# Patient Record
Sex: FEMALE | Race: WHITE | NOT HISPANIC OR LATINO | Employment: FULL TIME | URBAN - METROPOLITAN AREA
[De-identification: names, ages, dates, MRNs, and addresses within clinical notes are randomized per-mention and may not be internally consistent; named-entity substitution may affect disease eponyms.]

---

## 2017-02-16 ENCOUNTER — ALLSCRIPTS OFFICE VISIT (OUTPATIENT)
Dept: OTHER | Facility: OTHER | Age: 62
End: 2017-02-16

## 2017-02-16 DIAGNOSIS — I10 ESSENTIAL (PRIMARY) HYPERTENSION: ICD-10-CM

## 2017-02-16 DIAGNOSIS — Z00.00 ENCOUNTER FOR GENERAL ADULT MEDICAL EXAMINATION WITHOUT ABNORMAL FINDINGS: ICD-10-CM

## 2017-02-16 DIAGNOSIS — E78.5 HYPERLIPIDEMIA: ICD-10-CM

## 2017-02-16 DIAGNOSIS — K21.9 GASTRO-ESOPHAGEAL REFLUX DISEASE WITHOUT ESOPHAGITIS: ICD-10-CM

## 2017-02-16 DIAGNOSIS — E55.9 VITAMIN D DEFICIENCY: ICD-10-CM

## 2017-02-16 DIAGNOSIS — M19.90 OSTEOARTHRITIS: ICD-10-CM

## 2017-03-27 ENCOUNTER — HOSPITAL ENCOUNTER (OUTPATIENT)
Dept: MAMMOGRAPHY | Facility: MEDICAL CENTER | Age: 62
Discharge: HOME | End: 2017-03-27
Payer: COMMERCIAL

## 2017-03-27 DIAGNOSIS — Z12.31 VISIT FOR SCREENING MAMMOGRAM: ICD-10-CM

## 2017-03-27 DIAGNOSIS — Z00.00 ENCOUNTER FOR GENERAL ADULT MEDICAL EXAMINATION WITHOUT ABNORMAL FINDINGS: ICD-10-CM

## 2017-03-27 PROCEDURE — G0202 SCR MAMMO BI INCL CAD: HCPCS

## 2017-03-27 PROCEDURE — 77063 BREAST TOMOSYNTHESIS BI: CPT

## 2017-03-29 ENCOUNTER — GENERIC CONVERSION - ENCOUNTER (OUTPATIENT)
Dept: OTHER | Facility: OTHER | Age: 62
End: 2017-03-29

## 2017-05-01 ENCOUNTER — ALLSCRIPTS OFFICE VISIT (OUTPATIENT)
Dept: OTHER | Facility: OTHER | Age: 62
End: 2017-05-01

## 2017-05-01 PROCEDURE — 87624 HPV HI-RISK TYP POOLED RSLT: CPT | Performed by: OBSTETRICS & GYNECOLOGY

## 2017-05-01 PROCEDURE — G0145 SCR C/V CYTO,THINLAYER,RESCR: HCPCS | Performed by: OBSTETRICS & GYNECOLOGY

## 2017-05-02 ENCOUNTER — LAB REQUISITION (OUTPATIENT)
Dept: LAB | Facility: HOSPITAL | Age: 62
End: 2017-05-02
Payer: COMMERCIAL

## 2017-05-02 DIAGNOSIS — Z12.4 ENCOUNTER FOR SCREENING FOR MALIGNANT NEOPLASM OF CERVIX: ICD-10-CM

## 2017-05-04 LAB — HPV RRNA GENITAL QL NAA+PROBE: NORMAL

## 2017-05-09 LAB
LAB AP GYN PRIMARY INTERPRETATION: NORMAL
Lab: NORMAL

## 2017-05-10 ENCOUNTER — GENERIC CONVERSION - ENCOUNTER (OUTPATIENT)
Dept: OTHER | Facility: OTHER | Age: 62
End: 2017-05-10

## 2017-07-20 ENCOUNTER — APPOINTMENT (OUTPATIENT)
Dept: LAB | Facility: CLINIC | Age: 62
End: 2017-07-20
Payer: COMMERCIAL

## 2017-07-20 ENCOUNTER — TRANSCRIBE ORDERS (OUTPATIENT)
Dept: LAB | Facility: CLINIC | Age: 62
End: 2017-07-20

## 2017-07-20 DIAGNOSIS — E78.5 HYPERLIPIDEMIA: ICD-10-CM

## 2017-07-20 DIAGNOSIS — M19.90 OSTEOARTHRITIS: ICD-10-CM

## 2017-07-20 DIAGNOSIS — I10 ESSENTIAL (PRIMARY) HYPERTENSION: ICD-10-CM

## 2017-07-20 DIAGNOSIS — K21.9 GASTRO-ESOPHAGEAL REFLUX DISEASE WITHOUT ESOPHAGITIS: ICD-10-CM

## 2017-07-20 DIAGNOSIS — E55.9 VITAMIN D DEFICIENCY: ICD-10-CM

## 2017-07-20 LAB
25(OH)D3 SERPL-MCNC: 20.9 NG/ML (ref 30–100)
ALBUMIN SERPL BCP-MCNC: 3.8 G/DL (ref 3.5–5)
ALP SERPL-CCNC: 86 U/L (ref 46–116)
ALT SERPL W P-5'-P-CCNC: 27 U/L (ref 12–78)
ANION GAP SERPL CALCULATED.3IONS-SCNC: 4 MMOL/L (ref 4–13)
AST SERPL W P-5'-P-CCNC: 13 U/L (ref 5–45)
BACTERIA UR QL AUTO: NORMAL /HPF
BASOPHILS # BLD AUTO: 0.04 THOUSANDS/ΜL (ref 0–0.1)
BASOPHILS NFR BLD AUTO: 1 % (ref 0–1)
BILIRUB SERPL-MCNC: 0.45 MG/DL (ref 0.2–1)
BILIRUB UR QL STRIP: NEGATIVE
BUN SERPL-MCNC: 17 MG/DL (ref 5–25)
CALCIUM SERPL-MCNC: 9 MG/DL (ref 8.3–10.1)
CHLORIDE SERPL-SCNC: 105 MMOL/L (ref 100–108)
CHOLEST SERPL-MCNC: 190 MG/DL (ref 50–200)
CLARITY UR: CLEAR
CO2 SERPL-SCNC: 27 MMOL/L (ref 21–32)
COLOR UR: YELLOW
CREAT SERPL-MCNC: 0.86 MG/DL (ref 0.6–1.3)
EOSINOPHIL # BLD AUTO: 0.16 THOUSAND/ΜL (ref 0–0.61)
EOSINOPHIL NFR BLD AUTO: 3 % (ref 0–6)
ERYTHROCYTE [DISTWIDTH] IN BLOOD BY AUTOMATED COUNT: 13.3 % (ref 11.6–15.1)
GFR SERPL CREATININE-BSD FRML MDRD: >60 ML/MIN/1.73SQ M
GLUCOSE P FAST SERPL-MCNC: 89 MG/DL (ref 65–99)
GLUCOSE UR STRIP-MCNC: NEGATIVE MG/DL
HCT VFR BLD AUTO: 38.7 % (ref 34.8–46.1)
HDLC SERPL-MCNC: 60 MG/DL (ref 40–60)
HGB BLD-MCNC: 12.4 G/DL (ref 11.5–15.4)
HGB UR QL STRIP.AUTO: ABNORMAL
HYALINE CASTS #/AREA URNS LPF: NORMAL /LPF
KETONES UR STRIP-MCNC: NEGATIVE MG/DL
LDLC SERPL CALC-MCNC: 109 MG/DL (ref 0–100)
LEUKOCYTE ESTERASE UR QL STRIP: ABNORMAL
LYMPHOCYTES # BLD AUTO: 2.54 THOUSANDS/ΜL (ref 0.6–4.47)
LYMPHOCYTES NFR BLD AUTO: 41 % (ref 14–44)
MCH RBC QN AUTO: 29.7 PG (ref 26.8–34.3)
MCHC RBC AUTO-ENTMCNC: 32 G/DL (ref 31.4–37.4)
MCV RBC AUTO: 93 FL (ref 82–98)
MONOCYTES # BLD AUTO: 0.52 THOUSAND/ΜL (ref 0.17–1.22)
MONOCYTES NFR BLD AUTO: 8 % (ref 4–12)
NEUTROPHILS # BLD AUTO: 2.89 THOUSANDS/ΜL (ref 1.85–7.62)
NEUTS SEG NFR BLD AUTO: 47 % (ref 43–75)
NITRITE UR QL STRIP: NEGATIVE
NON-SQ EPI CELLS URNS QL MICRO: NORMAL /HPF
NRBC BLD AUTO-RTO: 0 /100 WBCS
PH UR STRIP.AUTO: 6 [PH] (ref 4.5–8)
PLATELET # BLD AUTO: 326 THOUSANDS/UL (ref 149–390)
PMV BLD AUTO: 9.7 FL (ref 8.9–12.7)
POTASSIUM SERPL-SCNC: 4.2 MMOL/L (ref 3.5–5.3)
PROT SERPL-MCNC: 7.6 G/DL (ref 6.4–8.2)
PROT UR STRIP-MCNC: NEGATIVE MG/DL
RBC # BLD AUTO: 4.17 MILLION/UL (ref 3.81–5.12)
RBC #/AREA URNS AUTO: NORMAL /HPF
SODIUM SERPL-SCNC: 136 MMOL/L (ref 136–145)
SP GR UR STRIP.AUTO: 1.01 (ref 1–1.03)
TRIGL SERPL-MCNC: 106 MG/DL
TSH SERPL DL<=0.05 MIU/L-ACNC: 2.8 UIU/ML (ref 0.36–3.74)
UROBILINOGEN UR QL STRIP.AUTO: 0.2 E.U./DL
WBC # BLD AUTO: 6.17 THOUSAND/UL (ref 4.31–10.16)
WBC #/AREA URNS AUTO: NORMAL /HPF

## 2017-07-20 PROCEDURE — 82306 VITAMIN D 25 HYDROXY: CPT

## 2017-07-20 PROCEDURE — 80061 LIPID PANEL: CPT

## 2017-07-20 PROCEDURE — 84443 ASSAY THYROID STIM HORMONE: CPT

## 2017-07-20 PROCEDURE — 81001 URINALYSIS AUTO W/SCOPE: CPT

## 2017-07-20 PROCEDURE — 85025 COMPLETE CBC W/AUTO DIFF WBC: CPT

## 2017-07-20 PROCEDURE — 36415 COLL VENOUS BLD VENIPUNCTURE: CPT

## 2017-07-20 PROCEDURE — 80053 COMPREHEN METABOLIC PANEL: CPT

## 2017-07-21 ENCOUNTER — GENERIC CONVERSION - ENCOUNTER (OUTPATIENT)
Dept: OTHER | Facility: OTHER | Age: 62
End: 2017-07-21

## 2017-08-09 ENCOUNTER — LAB REQUISITION (OUTPATIENT)
Dept: LAB | Facility: HOSPITAL | Age: 62
End: 2017-08-09
Payer: COMMERCIAL

## 2017-08-09 ENCOUNTER — ALLSCRIPTS OFFICE VISIT (OUTPATIENT)
Dept: OTHER | Facility: OTHER | Age: 62
End: 2017-08-09

## 2017-08-09 DIAGNOSIS — R35.0 FREQUENCY OF MICTURITION: ICD-10-CM

## 2017-08-09 DIAGNOSIS — N39.0 URINARY TRACT INFECTION: ICD-10-CM

## 2017-08-09 LAB
BILIRUB UR QL STRIP: NEGATIVE
CLARITY UR: NORMAL
COLOR UR: CLEAR
GLUCOSE (HISTORICAL): NEGATIVE
HGB UR QL STRIP.AUTO: NORMAL
KETONES UR STRIP-MCNC: NEGATIVE MG/DL
LEUKOCYTE ESTERASE UR QL STRIP: NORMAL
NITRITE UR QL STRIP: NEGATIVE
PH UR STRIP.AUTO: 6.5 [PH]
PROT UR STRIP-MCNC: NEGATIVE MG/DL
SP GR UR STRIP.AUTO: 1.01
UROBILINOGEN UR QL STRIP.AUTO: 0.2

## 2017-08-09 PROCEDURE — 87186 SC STD MICRODIL/AGAR DIL: CPT | Performed by: FAMILY MEDICINE

## 2017-08-09 PROCEDURE — 87077 CULTURE AEROBIC IDENTIFY: CPT | Performed by: FAMILY MEDICINE

## 2017-08-09 PROCEDURE — 87086 URINE CULTURE/COLONY COUNT: CPT | Performed by: FAMILY MEDICINE

## 2017-08-11 LAB
BACTERIA UR CULT: NORMAL
BACTERIA UR CULT: NORMAL

## 2018-01-12 VITALS — BODY MASS INDEX: 28 KG/M2 | HEIGHT: 63 IN | WEIGHT: 158 LBS

## 2018-01-12 NOTE — RESULT NOTES
Verified Results  (1) CBC/PLT/DIFF [id number] 08:40AM [facility]    Order Number: [id number]     Test Name Result Flag Reference   WBC COUNT 6 17 Thousand/uL  4 31-10 16   RBC COUNT 4 17 Million/uL  3 81-5 12   HEMOGLOBIN 12 4 g/dL  11 5-15 4   HEMATOCRIT 38 7 %  34 8-46  1   MCV 93 fL  82-98   MCH 29 7 pg  26 8-34 3   MCHC 32 0 g/dL  31 4-37 4   RDW 13 3 %  11 6-15 1   MPV 9 7 fL  8 9-12 7   PLATELET COUNT 043 Thousands/uL  149-390   nRBC AUTOMATED 0 /100 WBCs     NEUTROPHILS RELATIVE PERCENT 47 %  43-75   LYMPHOCYTES RELATIVE PERCENT 41 %  14-44   MONOCYTES RELATIVE PERCENT 8 %  4-12   EOSINOPHILS RELATIVE PERCENT 3 %  0-6   BASOPHILS RELATIVE PERCENT 1 %  0-1   NEUTROPHILS ABSOLUTE COUNT 2 89 Thousands/? ??L  1 85-7 62   LYMPHOCYTES ABSOLUTE COUNT 2 54 Thousands/? ??L  0 60-4 47   MONOCYTES ABSOLUTE COUNT 0 52 Thousand/? ??L  0 17-1 22   EOSINOPHILS ABSOLUTE COUNT 0 16 Thousand/? ??L  0 00-0 61   BASOPHILS ABSOLUTE COUNT 0 04 Thousands/? ??L  0 00-0 10   - Patient Instructions: This bloodwork is non-fasting  Please drink two glasses of water morning of bloodwork       (1) COMPREHENSIVE METABOLIC PANEL [id number] 47:45QR [employer] Order Number: [id number]     Test Name Result Flag Reference   SODIUM 136 mmol/L  136-145   POTASSIUM 4 2 mmol/L  3 5-5 3   CHLORIDE 105 mmol/L  100-108   CARBON DIOXIDE 27 mmol/L  21-32   ANION GAP (CALC) 4 mmol/L  4-13   BLOOD UREA NITROGEN 17 mg/dL  5-25   CREATININE 0 86 mg/dL  0 60-1 30   Standardized to IDMS reference method   CALCIUM 9 0 mg/dL  8 3-10 1   BILI, TOTAL 0 45 mg/dL  0 20-1 00   ALK PHOSPHATAS 86 U/L     ALT (SGPT) 27 U/L  12-78   AST(SGOT) 13 U/L  5-45   ALBUMIN 3 8 g/dL  3 5-5 0   TOTAL PROTEIN 7 6 g/dL  6 4-8 2   eGFR Non-African American      >60 0 ml/min/1 73sq m   [employer] [employer] Disease Education Program recommendations are as follows:  GFR calculation is accurate only with a steady state creatinine  Chronic Kidney disease less than 60 ml/min/1 73 sq  meters  Kidney failure less than 15 ml/min/1 73 sq  meters  GLUCOSE FASTING 89 mg/dL  65-99     (1) LIPID PANEL, FASTING 20Jul2017 08:40AM [facility] Order Number: [id number]     Test Name Result Flag Reference   CHOLESTEROL 190 mg/dL     HDL,DIRECT 60 mg/dL  40-60   Specimen collection should occur prior to Metamizole administration due to the potential for falsely depressed results  LDL CHOLESTEROL CALCULATED 109 mg/dL H 0-100   - Patient Instructions: This is a fasting blood test  Water,black tea or black  coffee only after 9:00pm the night before test   Drink 2 glasses of water the morning of test       Triglyceride:         Normal              <150 mg/dl       Borderline High    150-199 mg/dl       High               200-499 mg/dl       Very High          >499 mg/dl  Cholesterol:         Desirable        <200 mg/dl      Borderline High  200-239 mg/dl      High             >239 mg/dl  HDL Cholesterol:        High    >59 mg/dL      Low     <41 mg/dL  LDL CALCULATED:    This screening LDL is a calculated result  It does not have the accuracy of the Direct Measured LDL in the monitoring of patients with hyperlipidemia and/or statin therapy  Direct Measure LDL (XDX135) must be ordered separately in these patients  TRIGLYCERIDES 106 mg/dL  <=150   Specimen collection should occur prior to N-Acetylcysteine or Metamizole administration due to the potential for falsely depressed results  (1) TSH WITH FT4 REFLEX 20Jul2017 08:40AM [facility] Order Number: [id number]     Test Name Result Flag Reference   TSH 2 800 uIU/mL  0 358-3 740   Patients undergoing fluorescein dye angiography may retain small amounts of fluorescein in the body for 48-72 hours post procedure  Samples containing fluorescein can produce falsely depressed TSH values   If the patient had this procedure,a specimen should be resubmitted post fluorescein clearance  The recommended reference ranges for TSH during pregnancy are as follows:  First trimester 0 1 to 2 5 uIU/mL  Second trimester  0 2 to 3 0 uIU/mL  Third trimester 0 3 to 3 0 uIU/m     (1) URINALYSIS (will reflex a microscopy if leukocytes, occult blood, protein or nitrites are not within normal limits) [id number] 08:40AM [facility] Order Number: [id number]     Test Name Result Flag Reference   COLOR Yellow     CLARITY Clear     SPECIFIC GRAVITY UA 1 011  1 003-1 030   PH UA 6 0  4 5-8 0   LEUKOCYTE ESTERASE UA Trace A Negative   NITRITE UA Negative  Negative   PROTEIN UA Negative mg/dl  Negative   GLUCOSE UA Negative mg/dl  Negative   KETONES UA Negative mg/dl  Negative   UROBILINOGEN UA 0 2 E U /dl  0 2, 1 0 E U /dl   BILIRUBIN UA Negative  Negative   BLOOD UA Trace A Negative   BACTERIA None Seen /hpf  None Seen, Occasional   EPITHELIAL CELLS Occasional /hpf  None Seen, Occasional   HYALINE CASTS None Seen /lpf  None Seen   RBC UA None Seen /hpf  None Seen   WBC UA None Seen /hpf  None Seen     (1) VITAMIN D 25-HYDROXY [id number] 08:40AM [facility] Order Number: [id number]     Test Name Result Flag Reference   VIT D 25-HYDROX 20 9 ng/mL L 30 0-100 0   This assay is a certified procedure of the CDC Vitamin D Standardization Certification Program (VDSCP)     Deficiency <20ng/ml   Insufficiency 20-30ng/ml   Sufficient  ng/ml     *Patients undergoing fluorescein dye angiography may retain small amounts of fluorescein in the body for 48-72 hours post procedure  Samples containing fluorescein can produce falsely elevated Vitamin D values  If the patient had this procedure, a specimen should be resubmitted post fluorescein clearance

## 2018-01-13 NOTE — RESULT NOTES
Verified Results  (1) THIN PREP PAP WITH IMAGING [id number] 12:00AM [facility]     Test Name Result Flag Reference   LAB AP CASE REPORT (Report)     Gynecologic Cytology Report            Case: [id number]                  Authorizing Provider: Jonny Jeans, MD    Collected:      05/01/2017           First Screen:     FANNY Morgan    Received:      05/03/2017 8491        Rescreen:       FANYN Hunt                           Specimen:  LIQUID-BASED PAP, SCREENING, Endocervical   HPV HIGH RISK RESULT (Report)     HPV, High Risk: HPV NEG, HPV16 NEG, HPV18 NEG      Other High Risk HPV Negative, HPV 16 Negative, HPV 18 Negative  HPV types: 16,18,31,33,35,39,45,51,52,56,58,59,66 and 68 DNA are undetectable or below the pre-set threshold  Roche???s FDA approved [place] 4800 is utilized with strict adherence to the ???s instruction  manual to test for the presence of High-Risk HPV DNA, as well as HPV 16 and HPV 18  This instrument  has been validated by our laboratory and/or by the   A negative result does not preclude the presence of HPV infection because results depend on adequate  specimen collection, absence of inhibitors and sufficient DNA to be detected  Additionally, HPV negative  results are not intended to prevent women from proceeding to colposcopy if clinically warranted  Positive HPV test results indicate the presence of any one or more of the high risk types, but since patients  are often co-infected with low-risk types it does not rule out the presence of low-risk types in patients  with mixed infections  LAB AP GYN PRIMARY INTERPRETATION      Negative for intraepithelial lesion or malignancy  Electronically signed by FANNY Hunt on 5/9/2017 at 4:06 PM   LAB AP GYN SPECIMEN ADEQUACY      Satisfactory for evaluation  Endocervical/transformation zone component present     LAB AP GYN ADDITIONAL INFORMATION (Report)     [employer]'s FDA approved ,  and ThinPrep Imaging System are   utilized with strict adherence to the 's instruction manual to   prepare gynecologic and non-gynecologic cytology specimens for the   production of ThinPrep slides as well as for gynecologic ThinPrep imaging  These processes have been validated by our laboratory and/or by the     The Pap test is not a diagnostic procedure and should not be used as the   sole means to detect cervical cancer  It is only a screening procedure to   aid in the detection of cervical cancer and its precursors  Both   false-negative and false-positive results have been experienced  Your   patient's test result should be interpreted in this context together with   the history and clinical findings

## 2018-01-14 VITALS
HEART RATE: 76 BPM | SYSTOLIC BLOOD PRESSURE: 120 MMHG | BODY MASS INDEX: 28.44 KG/M2 | DIASTOLIC BLOOD PRESSURE: 66 MMHG | WEIGHT: 158 LBS | TEMPERATURE: 98.1 F

## 2018-01-14 VITALS
HEART RATE: 80 BPM | DIASTOLIC BLOOD PRESSURE: 70 MMHG | WEIGHT: 155.5 LBS | HEIGHT: 63 IN | BODY MASS INDEX: 27.55 KG/M2 | SYSTOLIC BLOOD PRESSURE: 122 MMHG

## 2018-01-16 NOTE — RESULT NOTES
Message   Mammogram is normal      Verified Results  [facility] CAD [id number] 04:44PM Himanshu Beto Order Number: [id number]    - Patient Instructions: To schedule this appointment, please contact Central Scheduling at [phone]  Do not wear any perfume, powder, lotion or deodorant on breast or underarm area  Please bring your doctors order, referral (if needed) and insurance information with you on the day of the test  Failure to bring this information may result in this test being rescheduled  Arrive 15 minutes prior to your appointment time to register  On the day of your test, please bring any prior mammogram or breast studies with you that were not performed at a [facility]  Failure to bring prior exams may result in your test needing to be rescheduled  Test Name Result Flag Reference   MAMMO SCREENING BILATERAL W 3D & CAD (Report)     Patient History:   Patient is postmenopausal    Family history of breast cancer at age 72 in maternal aunt,    breast cancer at age 72 in maternal grandmother, breast cancer at   age 72 in maternal aunt  Excisional biopsy of the left breast, 2005  No Hormone Replacement Therapy   Patient is a former smoker  Patient's BMI is 25 7  Reason for exam: screening, asymptomatic  Mammo Screening Bilateral W DBT and CAD: March 27, 2017 - Check    In #: [de-identified]   2D/3D Procedure   3D views: Bilateral MLO view(s) were taken  2D views: Bilateral CC view(s) were taken  MLO view(s) were    taken of the right breast        Technologist: Nghia Dominguez, RT(R)(M)   Prior study comparison: November 7, 2014, bilateral digital    screening mammogram performed at [facility]  November 6, 2013, bilateral digital screening    mammogram performed at [place] [address]   November 5, 2012, bilateral digital screening mammogram    performed at [address] [place] [address] Center  October 26, 2011, bilateral digital screening mammogram performed at [place] [address]  October 25, 2010,    bilateral digital screening mammogram performed at [place] [address]  There are scattered fibroglandular densities  A combination of    mediolateral oblique 3-D tomographic slices as well as standard    two-dimensional orthogonal images were obtained  The parenchymal pattern appears stable  No dominant soft tissue    mass or suspicious calcifications are noted  A stable nodular    asymmetry is seen in the outer left breast  The skin and nipple    contours are within normal limits  No mammographic evidence of malignancy  No    significant changes when compared with prior studies  ACR BI-RADSï¾® Assessments: BiRad:1 - Negative     Recommendation:   Routine screening mammogram in 1 year  A reminder letter will be   scheduled  8-10% of cancers will be missed on mammography  Management of a    palpable abnormality must be based on clinical grounds  Patients    will be notified of their results via letter from our facility  Accredited by [employer] of Radiology and FDA  Transcription Location: LAVINIA Rodrigues 98: [id number]     Risk Value(s):   Tyrer-Cuzick 10 Year: 5 200%, Tyrer-Cuzick Lifetime: 13 300%,    Myriad Table: 1 5%, ADAN 5 Year: 1 6%, NCI Lifetime: 8 5%, MRS    : Based on personal and/or family history,    consideration of hereditary risk assessment may be warranted     Signed by:   Real Blount MD   3/28/17

## 2018-02-05 DIAGNOSIS — I10 ESSENTIAL HYPERTENSION: ICD-10-CM

## 2018-02-05 DIAGNOSIS — M19.90 ARTHRITIS: Primary | ICD-10-CM

## 2018-02-05 PROBLEM — F41.9 ANXIETY DISORDER: Status: ACTIVE | Noted: 2017-08-09

## 2018-02-05 RX ORDER — MELOXICAM 7.5 MG/1
TABLET ORAL
Qty: 90 TABLET | Refills: 1 | Status: SHIPPED | OUTPATIENT
Start: 2018-02-05 | End: 2018-08-31 | Stop reason: SDUPTHER

## 2018-02-05 RX ORDER — LISINOPRIL 10 MG/1
TABLET ORAL
Qty: 90 TABLET | Refills: 1 | Status: SHIPPED | OUTPATIENT
Start: 2018-02-05 | End: 2018-08-04 | Stop reason: SDUPTHER

## 2018-08-04 DIAGNOSIS — I10 ESSENTIAL HYPERTENSION: ICD-10-CM

## 2018-08-04 DIAGNOSIS — M19.90 ARTHRITIS: ICD-10-CM

## 2018-08-07 RX ORDER — LISINOPRIL 10 MG/1
TABLET ORAL
Qty: 30 TABLET | Refills: 0 | Status: SHIPPED | OUTPATIENT
Start: 2018-08-07 | End: 2018-08-31 | Stop reason: SDUPTHER

## 2018-08-07 RX ORDER — MELOXICAM 7.5 MG/1
TABLET ORAL
Qty: 90 TABLET | Refills: 1 | OUTPATIENT
Start: 2018-08-07

## 2018-08-27 ENCOUNTER — TELEPHONE (OUTPATIENT)
Dept: FAMILY MEDICINE CLINIC | Facility: CLINIC | Age: 63
End: 2018-08-27

## 2018-08-27 NOTE — TELEPHONE ENCOUNTER
[facility] insurance customer service ([phone]) on 8/27/18 at 3:15PM   Spoke with Alycia Mitchell, reference #[id number]  Per rep, [address] [place], [place] [address] is a participating provider with this Pt's plan

## 2018-08-31 ENCOUNTER — OFFICE VISIT (OUTPATIENT)
Dept: FAMILY MEDICINE CLINIC | Facility: CLINIC | Age: 63
End: 2018-08-31
Payer: COMMERCIAL

## 2018-08-31 VITALS
BODY MASS INDEX: 28.47 KG/M2 | HEART RATE: 80 BPM | SYSTOLIC BLOOD PRESSURE: 118 MMHG | WEIGHT: 158.2 LBS | DIASTOLIC BLOOD PRESSURE: 60 MMHG

## 2018-08-31 DIAGNOSIS — M25.531 RIGHT WRIST PAIN: ICD-10-CM

## 2018-08-31 DIAGNOSIS — G56.01 CARPAL TUNNEL SYNDROME OF RIGHT WRIST: Primary | ICD-10-CM

## 2018-08-31 DIAGNOSIS — F41.9 ANXIETY DISORDER, UNSPECIFIED TYPE: ICD-10-CM

## 2018-08-31 DIAGNOSIS — M19.90 ARTHRITIS: ICD-10-CM

## 2018-08-31 DIAGNOSIS — Z12.31 ENCOUNTER FOR SCREENING MAMMOGRAM FOR BREAST CANCER: ICD-10-CM

## 2018-08-31 DIAGNOSIS — M25.50 ARTHRALGIA OF MULTIPLE JOINTS: ICD-10-CM

## 2018-08-31 DIAGNOSIS — E78.5 HYPERLIPIDEMIA, UNSPECIFIED HYPERLIPIDEMIA TYPE: ICD-10-CM

## 2018-08-31 DIAGNOSIS — E55.9 VITAMIN D DEFICIENCY: ICD-10-CM

## 2018-08-31 DIAGNOSIS — I10 ESSENTIAL HYPERTENSION: ICD-10-CM

## 2018-08-31 PROCEDURE — 99214 OFFICE O/P EST MOD 30 MIN: CPT | Performed by: FAMILY MEDICINE

## 2018-08-31 RX ORDER — OMEPRAZOLE 20 MG/1
TABLET, DELAYED RELEASE ORAL
COMMUNITY
Start: 2017-02-16 | End: 2021-06-08 | Stop reason: SDUPTHER

## 2018-08-31 RX ORDER — MELOXICAM 7.5 MG/1
7.5 TABLET ORAL DAILY
Qty: 90 TABLET | Refills: 0 | Status: SHIPPED | OUTPATIENT
Start: 2018-08-31 | End: 2018-11-13 | Stop reason: SDUPTHER

## 2018-08-31 RX ORDER — LISINOPRIL 10 MG/1
10 TABLET ORAL DAILY
Qty: 90 TABLET | Refills: 3 | Status: SHIPPED | OUTPATIENT
Start: 2018-08-31 | End: 2019-08-12 | Stop reason: SDUPTHER

## 2018-08-31 RX ORDER — LORAZEPAM 0.5 MG/1
0.5 TABLET ORAL AS NEEDED
Qty: 30 TABLET | Refills: 0 | Status: SHIPPED | OUTPATIENT
Start: 2018-08-31 | End: 2019-11-19 | Stop reason: SDUPTHER

## 2018-08-31 RX ORDER — ACETAMINOPHEN,DIPHENHYDRAMINE HCL 500; 25 MG/1; MG/1
TABLET, FILM COATED ORAL
COMMUNITY
Start: 2017-02-16

## 2018-08-31 RX ORDER — MELATONIN
1000 DAILY
Qty: 30 TABLET | Refills: 0
Start: 2018-08-31

## 2018-08-31 RX ORDER — LORAZEPAM 0.5 MG/1
TABLET ORAL
COMMUNITY
End: 2018-08-31 | Stop reason: SDUPTHER

## 2018-08-31 NOTE — PATIENT INSTRUCTIONS
Carpal Tunnel Syndrome   AMBULATORY CARE:   Carpal tunnel syndrome (CTS)  is a condition that causes pressure to build in the carpal tunnel  The carpal tunnel is a small area between bones and tissues in your wrist  Swelling in this area puts pressure on the median nerve  The median nerve controls muscles and feeling in the hand  Common signs and symptoms:   · Dull, sharp, or shooting pain in your hand    · Numbness, tingling, or a burning feeling in your thumb, first finger, and middle finger    · Arm pain that may extend to your shoulder    · Weakness in your hand    · Swelling in your hand    · Not being able to control how your hand moves, or you drop objects  Seek care immediately if:   · You suddenly lose feeling in your hand or fingers and you cannot move them  · Your hand suddenly changes color  Contact your healthcare provider if:   · Your symptoms get worse  · Your hand and fingers are so weak that you cannot grab, squeeze, or lift items  · You have questions or concerns about your condition or care  Treatment  may not be needed  If your symptoms continue or are severe, you may need any of the following:  · NSAIDs  may be recommended to decrease swelling and pain  NSAIDs are available without a doctor's order  Ask your healthcare provider which medicine is right for you and how much to take  Take as directed  NSAIDs can cause stomach bleeding or kidney problems if not taken correctly  If you take blood thinning medicine, always ask your healthcare provider if NSAIDs are safe for you  · Steroid injections  may help decrease pain and swelling  Steroid medicine is injected into the carpal tunnel  · Transcutaneous electric nerve stimulation  uses mild electrical impulses to help decrease your wrist pain      · Surgery  called decompression may be used to take pressure off of the median nerve in your wrist   Manage your symptoms:   · Apply ice to your wrist   Ice helps decrease swelling and pain in your wrist  Ice may also help prevent tissue damage  Use an ice pack, or put crushed ice in a plastic bag  Cover the ice pack with a towel  Place it on your wrist for 15 to 20 minutes every hour, or as directed  · Rest your hands  Let your hands rest for a short time between repetitive motions, such as typing  If you feel pain, stop what you are doing and gently massage your wrist and hand  · Get physical and occupational therapy, if directed  Physical therapists will show you ways to exercise and strengthen your wrist  Occupational therapists will show you safe ways to use your wrist while you do your usual activities  · Use a wrist splint as directed  A splint will keep your wrist straight or in a slightly bent position  A wrist splint decreases pressure on the median nerve by letting your wrist rest  You may need to wear the splint for up to 8 weeks  You may need to wear it at night  Follow up with your healthcare provider as directed:  Write down your questions so you remember to ask them during your visits  © 2017 [facility] Information is for End User's use only and may not be sold, redistributed or otherwise used for commercial purposes  All illustrations and images included in CareNotes® are the copyrighted property of A D A M , Inc  or Marty Hernandez  The above information is an  only  It is not intended as medical advice for individual conditions or treatments  Talk to your doctor, nurse or pharmacist before following any medical regimen to see if it is safe and effective for you

## 2018-08-31 NOTE — PROGRESS NOTES
Assessment/Plan:    Carpal tunnel syndrome of right wrist  Right wrist pain, most likely carpal tunnel syndrome specially with her job function  Is start affecting her right hand strength, patient to continue with the NSAIDs until seen by the Orthopedics Hand surgery, advised to continue with splinting, icing  Hypertension  Very well controlled, continue with the lisinopril  GERD (gastroesophageal reflux disease)  Stable on over-the-counter Prilosec, had a very long discussion with the patient about the long-term use of NSAIDs and her acid reflux, patient need to be seen by Gastroenterology at some point for evaluation of chronic Anca Bernadette and the current use of NSAIDs  Hyperlipidemia  Follow up on lipid panel, continue with no medication  Vitamin D deficiency  Seem to be stable, patient need a DEXA scan at some point       Diagnoses and all orders for this visit:    Carpal tunnel syndrome of right wrist  -     Ambulatory referral to Hand Surgery; Future    Essential hypertension  -     lisinopril (ZESTRIL) 10 mg tablet; Take 1 tablet (10 mg total) by mouth daily  -     CBC and differential  -     Comprehensive metabolic panel    Arthritis  -     LORazepam (ATIVAN) 0 5 mg tablet; Take 1 tablet (0 5 mg total) by mouth as needed for anxiety  -     meloxicam (MOBIC) 7 5 mg tablet; Take 1 tablet (7 5 mg total) by mouth daily Take with food    Anxiety disorder, unspecified type    Vitamin D deficiency  -     cholecalciferol (VITAMIN D3) 1,000 units tablet; Take 1 tablet (1,000 Units total) by mouth daily    Arthralgia of multiple joints  -     Sedimentation rate, automated  -     Cyclic citrul peptide antibody, IgG  -     Lyme Antibody Profile with reflex to WB  -     RF Screen w/ Reflex to Titer  -     Ambulatory referral to Hand Surgery;  Future    Hyperlipidemia, unspecified hyperlipidemia type  -     Lipid panel    Right wrist pain    Encounter for screening mammogram for breast cancer  -     Mammo screening bilateral w 3d & cad; Future    Other orders  -     Discontinue: LORazepam (ATIVAN) 0 5 mg tablet; Take by mouth  -     omeprazole (PRILOSEC OTC) 20 MG tablet; Take by mouth  -     diphenhydrAMINE-acetaminophen (TYLENOL PM EXTRA STRENGTH)  MG TABS; Take by mouth          Subjective: Follow up for medication refills  Discuss dose of Meloxicam    C/o pain in right wrist and into thumb and pointer finger  kck     Patient ID: Lisset Rodriguez is a 63 y o  female  Patient is here complaining of right wrist pain being going on for the last many years but getting worse lately, pain on the lateral side of the right wrist, radiate to the thumb and the pointer finger, no numbness no tingling, but there is loss of strength, patient cannot lift or use her right hand as before  Have trouble closing and opening the door knob  No swelling no trauma  Patient overall joint pain and aches improved after taking vitamin-D 4000 units a day,  Patient anxiety is very well controlled, use 30 tablets of Xanax over the last year and she still have some left  Patient denied any acute complaint, her blood pressure is very well controlled  Patient use meloxicam on daily basis for her aches and pain  The following portions of the patient's history were reviewed and updated as appropriate: allergies, current medications, past family history, past medical history, past social history, past surgical history and problem list     Review of Systems   Constitutional: Negative for appetite change, chills and fever  HENT: Negative for congestion, sore throat and voice change  Eyes: Negative for pain and visual disturbance  Respiratory: Negative for cough  Cardiovascular: Negative for chest pain and palpitations  Gastrointestinal: Negative for abdominal pain, constipation, diarrhea and nausea  Endocrine: Negative for cold intolerance, heat intolerance and polyuria     Genitourinary: Negative for dysuria, enuresis, flank pain and frequency  Musculoskeletal: Negative for gait problem, joint swelling and neck pain  Skin: Negative for color change and wound  Neurological: Negative for dizziness, syncope, speech difficulty, numbness and headaches  Psychiatric/Behavioral: Negative for behavioral problems and confusion  The patient is not nervous/anxious  Objective:    Vitals:    08/31/18 0935   BP: 118/60   BP Location: Left arm   Patient Position: Sitting   Pulse: 80   Weight: 71 8 kg (158 lb 3 2 oz)      Physical Exam   Constitutional: She is oriented to person, place, and time  She appears well-developed and well-nourished  HENT:   Head: Normocephalic and atraumatic  Eyes: Conjunctivae and EOM are normal  Pupils are equal, round, and reactive to light  Neck: Normal range of motion  Neck supple  Cardiovascular: Normal rate, regular rhythm, normal heart sounds and intact distal pulses  Pulmonary/Chest: Effort normal and breath sounds normal    Abdominal: Soft  Bowel sounds are normal    Musculoskeletal: Normal range of motion  Neurological: She is alert and oriented to person, place, and time  She has normal reflexes  Skin: Skin is warm and dry  Psychiatric: She has a normal mood and affect  Her behavior is normal    Vitals reviewed

## 2018-08-31 NOTE — ASSESSMENT & PLAN NOTE
Right wrist pain, most likely carpal tunnel syndrome specially with her job function  Is start affecting her right hand strength, patient to continue with the NSAIDs until seen by the Orthopedics Hand surgery, advised to continue with splinting, icing

## 2018-08-31 NOTE — ASSESSMENT & PLAN NOTE
Stable on over-the-counter Prilosec, had a very long discussion with the patient about the long-term use of NSAIDs and her acid reflux, patient need to be seen by Gastroenterology at some point for evaluation of chronic Anca Bernadette and the current use of NSAIDs

## 2018-11-13 DIAGNOSIS — M19.90 ARTHRITIS: ICD-10-CM

## 2018-11-13 RX ORDER — MELOXICAM 7.5 MG/1
7.5 TABLET ORAL DAILY
Qty: 90 TABLET | Refills: 1 | Status: SHIPPED | OUTPATIENT
Start: 2018-11-13 | End: 2019-05-12 | Stop reason: SDUPTHER

## 2019-03-27 ENCOUNTER — OFFICE VISIT (OUTPATIENT)
Dept: FAMILY MEDICINE CLINIC | Facility: CLINIC | Age: 64
End: 2019-03-27
Payer: COMMERCIAL

## 2019-03-27 VITALS
HEART RATE: 72 BPM | BODY MASS INDEX: 28.17 KG/M2 | WEIGHT: 159 LBS | HEIGHT: 63 IN | DIASTOLIC BLOOD PRESSURE: 68 MMHG | SYSTOLIC BLOOD PRESSURE: 118 MMHG | RESPIRATION RATE: 16 BRPM

## 2019-03-27 DIAGNOSIS — Z12.31 ENCOUNTER FOR SCREENING MAMMOGRAM FOR BREAST CANCER: ICD-10-CM

## 2019-03-27 DIAGNOSIS — Z79.1 ENCOUNTER FOR MONITORING CHRONIC NSAID THERAPY: ICD-10-CM

## 2019-03-27 DIAGNOSIS — M25.531 CHRONIC PAIN OF RIGHT WRIST: ICD-10-CM

## 2019-03-27 DIAGNOSIS — G56.01 CARPAL TUNNEL SYNDROME OF RIGHT WRIST: ICD-10-CM

## 2019-03-27 DIAGNOSIS — G89.29 CHRONIC PAIN OF RIGHT WRIST: ICD-10-CM

## 2019-03-27 DIAGNOSIS — Z51.81 ENCOUNTER FOR MONITORING CHRONIC NSAID THERAPY: ICD-10-CM

## 2019-03-27 DIAGNOSIS — E66.3 OVERWEIGHT (BMI 25.0-29.9): ICD-10-CM

## 2019-03-27 DIAGNOSIS — E78.5 HYPERLIPIDEMIA, UNSPECIFIED HYPERLIPIDEMIA TYPE: ICD-10-CM

## 2019-03-27 DIAGNOSIS — I10 ESSENTIAL HYPERTENSION: Primary | ICD-10-CM

## 2019-03-27 DIAGNOSIS — Z00.00 ENCOUNTER FOR HEALTH MAINTENANCE EXAMINATION: ICD-10-CM

## 2019-03-27 DIAGNOSIS — K21.9 GASTROESOPHAGEAL REFLUX DISEASE WITHOUT ESOPHAGITIS: ICD-10-CM

## 2019-03-27 DIAGNOSIS — M25.50 ARTHRALGIA OF MULTIPLE JOINTS: ICD-10-CM

## 2019-03-27 PROBLEM — E58 DIETARY CALCIUM DEFICIENCY: Status: ACTIVE | Noted: 2017-05-01

## 2019-03-27 PROCEDURE — 99214 OFFICE O/P EST MOD 30 MIN: CPT | Performed by: FAMILY MEDICINE

## 2019-03-27 PROCEDURE — 99396 PREV VISIT EST AGE 40-64: CPT | Performed by: FAMILY MEDICINE

## 2019-03-27 NOTE — PATIENT INSTRUCTIONS

## 2019-03-27 NOTE — PROGRESS NOTES
BMI Counseling: Body mass index is 28 17 kg/m²  Discussed the patient's BMI with her   The BMI {VB BMI Counselin}

## 2019-03-27 NOTE — PROGRESS NOTES
Assessment and Plan:    Hypertension  Stable cont with meds     GERD (gastroesophageal reflux disease)  Most likley due to chronic NSAIDS use , need GI ans upper endoscopy  Cont with PPI     Carpal tunnel syndrome of right wrist  Pt need to do BW to r/o rheumatological disease , fu with Hand surgeon, ice ,elevation, wrist brace   Arthralgia of multiple joints  Need to r/o rheumatological disease , BW to be done  Hyperlipidemia  Lipids are improving, cont with lifestyl modification  Encounter for monitoring chronic NSAID therapy  Pt need to be tapered of the meloxicam, she is taking it for the last 5 years , started by her previous family physician, discussed with patient in length about the SE of long term use of NSAID she understand but that keep her functioning  To consider Celebrex for long term use, pt will check with her insurance, pt need to r/o RA or other rheumatological problem   Diagnoses and all orders for this visit:    Essential hypertension    Carpal tunnel syndrome of right wrist  -     Ambulatory referral to Hand Surgery; Future  -     XR wrist 3+ vw right; Future    Gastroesophageal reflux disease without esophagitis    Chronic pain of right wrist  -     Ambulatory referral to Hand Surgery; Future  -     XR wrist 3+ vw right; Future    Overweight (BMI 25 0-29  9)    Encounter for screening mammogram for breast cancer  -     Mammo screening bilateral w 3d & cad; Future    Hyperlipidemia, unspecified hyperlipidemia type    Arthralgia of multiple joints    Encounter for monitoring chronic NSAID therapy    Encounter for health maintenance examination            Subjective:      Patient ID: Erik Hull is a 66 y o  female  CC:    Chief Complaint   Patient presents with    Follow-up     pt here for a follow up and to review blood work results  R Ritesh    Wrist Pain     right wrist pain x 1 year sometimes gets swollen         HPI:    Patient here for follow-up on her chronic condition include hypertension hyperlipidemia, overweight, chronic right wrist pain and arthralgia of multiple joints, patient could not do her rheumatological workup due to her insurance and financial issue, patient had her basic blood work which were all acceptable include CBC CMP and lipid panel  Patient continued to complain of right wrist pain, she is losing her strength on her right wrist she is unable to hold any heavy object, denied any numbness or tingling the pain is on wrist joint itself now radiating up to the lower forearm area, denied any trauma  The following portions of the patient's history were reviewed and updated as appropriate: allergies, current medications, past family history, past medical history, past social history, past surgical history and problem list       Review of Systems   Constitutional: Negative for activity change, appetite change, chills, diaphoresis, fatigue and fever  HENT: Negative for congestion, postnasal drip, sinus pressure, sore throat and voice change  Eyes: Negative for pain, redness and visual disturbance  Respiratory: Negative for cough, chest tightness, shortness of breath and wheezing  Cardiovascular: Negative for chest pain, palpitations and leg swelling  Gastrointestinal: Negative for abdominal pain, constipation, diarrhea and nausea  Endocrine: Negative for cold intolerance, heat intolerance and polyuria  Genitourinary: Negative for dysuria, enuresis, flank pain and frequency  Musculoskeletal: Positive for arthralgias, joint swelling and myalgias  Negative for gait problem, neck pain and neck stiffness  Skin: Negative for color change and wound  Neurological: Negative for dizziness, syncope, speech difficulty, numbness and headaches  Psychiatric/Behavioral: Negative for behavioral problems and confusion  The patient is not nervous/anxious            Data to review:     Objective:    Vitals:    03/27/19 0826   BP: 118/68   BP Location: Left arm   Patient Position: Sitting   Cuff Size: Large   Pulse: 72   Resp: 16   Weight: 72 1 kg (159 lb)   Height: 5' 3" (1 6 m)        Physical Exam   Constitutional: She is oriented to person, place, and time  She appears well-developed and well-nourished  HENT:   Head: Normocephalic and atraumatic  Eyes: Pupils are equal, round, and reactive to light  Conjunctivae and EOM are normal    Neck: Normal range of motion  Neck supple  Cardiovascular: Normal rate, regular rhythm, normal heart sounds and intact distal pulses  Pulmonary/Chest: Effort normal and breath sounds normal    Abdominal: Soft  Bowel sounds are normal    Musculoskeletal: Normal range of motion  Arms:  Neurological: She is alert and oriented to person, place, and time  She has normal reflexes  Skin: Skin is warm and dry  Psychiatric: She has a normal mood and affect  Her behavior is normal    Nursing note and vitals reviewed  BMI Counseling: Body mass index is 28 17 kg/m²  Discussed the patient's BMI with her  The BMI is above average  BMI counseling and education was provided to the patient  Nutrition recommendations include reducing portion sizes

## 2019-03-28 PROBLEM — M25.531 CHRONIC PAIN OF RIGHT WRIST: Status: ACTIVE | Noted: 2019-03-28

## 2019-03-28 PROBLEM — Z79.1 ENCOUNTER FOR MONITORING CHRONIC NSAID THERAPY: Status: ACTIVE | Noted: 2019-03-28

## 2019-03-28 PROBLEM — G89.29 CHRONIC PAIN OF RIGHT WRIST: Status: ACTIVE | Noted: 2019-03-28

## 2019-03-28 PROBLEM — Z51.81 ENCOUNTER FOR MONITORING CHRONIC NSAID THERAPY: Status: ACTIVE | Noted: 2019-03-28

## 2019-03-28 NOTE — ASSESSMENT & PLAN NOTE
Pt need to be tapered of the meloxicam, she is taking it for the last 5 years , started by her previous family physician, discussed with patient in length about the SE of long term use of NSAID she understand but that keep her functioning  To consider Celebrex for long term use, pt will check with her insurance, pt need to r/o RA or other rheumatological problem

## 2019-03-28 NOTE — ASSESSMENT & PLAN NOTE
Pt need to do BW to r/o rheumatological disease , fu with Hand surgeon, ice ,elevation, wrist brace

## 2019-03-28 NOTE — PROGRESS NOTES
FAMILY PRACTICE OFFICE VISIT       NAME: Simona Mitchell  AGE: 66 y o  SEX: female       : 1955        MRN: [id number]    DATE: 3/28/2019  TIME: 10:14 AM    Assessment and Plan   Health maintenance  Appear in good health   To work on her weight   Colonoscopy, Mammo is UTD, new mammogram needed  Chief Complaint   Patient here for Wellness visit    History of Present Illness     Well Adult Physical: Patient here for a comprehensive physical exam The patient reports no problems  Do you take any herbs or supplements that were not prescribed by a doctor? no Are you taking calcium supplements? no Are you taking aspirin daily? yes   History:  LMP: No LMP recorded  Menopause at 52 years  Last pap date:   Abnormal pap? no      Review of Systems   Review of Systems    Active Problem List     Patient Active Problem List   Diagnosis    Anxiety disorder    Arthralgia of multiple joints    Arthritis    Fatigue    GERD (gastroesophageal reflux disease)    Hyperlipidemia    Hypertension    Migraine headache    Other ovarian failure    Simple goiter    Vitamin D deficiency    Carpal tunnel syndrome of right wrist    Dietary calcium deficiency       Past Medical History:  Past Medical History:   Diagnosis Date    Diverticulosis     of colon  Last assessed: 12    Hypertension        Past Surgical History:  Past Surgical History:   Procedure Laterality Date    BREAST BIOPSY Left 2005    Percutaneous Needle Core  Dr Winnie Tolentino  2007    MMR  Polyp benign  Dr Carrington Leaks      Surgically induced  By dilation and curettage  24 yo    OTHER SURGICAL HISTORY  2005    Anterior Colporrhaphy (for pelvic relaxation)  Dr Jing Gann Bilateral     23 yo    URETHRAL SLING  2005    Mid  TOT  Dr Mortimer Pluck (c ant colporr, Prolift, extraperitoneal colpopexy    UTERINE SUSPENSION  2005    Prolift, mesh   Dr Martín Victoria History:  Family History   Problem Relation Age of Onset    Hypertension Mother     Dementia Father     Hypertension Father     Stroke Father     Alcohol abuse Brother     Drug abuse Brother     Breast cancer Maternal Grandmother         MA x2    Dementia Maternal Grandmother         Several a, u's    Deep vein thrombosis Maternal Grandmother         Pregnancy related    Osteoporosis Paternal Grandmother     Emphysema Paternal Grandfather     Heart disease Paternal Grandfather         Ischemic    Migraines Daughter     Migraines Son     Breast cancer Maternal Aunt         MA x2    Dementia Paternal Aunt         Several a, u's    Dementia Paternal Uncle         Several a, u's    Colon cancer Cousin     Diabetes type I Other         Mellitus    Lung disease Family        Social History:  Social History     Socioeconomic History    Marital status: /Civil Union     Spouse name: Not on file    Number of children: Not on file    Years of education: Not on file    Highest education level: Not on file   Occupational History    Occupation: Customer Service     Comment: [address] [employer]  Financial resource strain: Not on file    Food insecurity:     Worry: Not on file     Inability: Not on file   [employer] needs:     Medical: Not on file     Non-medical: Not on file   Tobacco Use    Smoking status: Former Smoker    Smokeless tobacco: Never Used    Tobacco comment: Secondhand smoke exposure   smoked in house until 1/17    46 yrs 2017   Substance and Sexual Activity    Alcohol use: Yes     Comment: Social/3x month    Drug use: No    Sexual activity: Not on file   Lifestyle    Physical activity:     Days per week: Not on file     Minutes per session: Not on file    Stress: Not on file   Relationships    Social connections:     Talks on phone: Not on file     Gets together: Not on file     Attends Nondenominational service: Not on file     Active member of club or organization: Not on file     Attends meetings of clubs or organizations: Not on file     Relationship status: Not on file    Intimate partner violence:     Fear of current or ex partner: Not on file     Emotionally abused: Not on file     Physically abused: Not on file     Forced sexual activity: Not on file   Other Topics Concern    Not on file   Social History Narrative    Uses safety equipment: seatbelts       Objective     Vitals:    03/27/19 0826   BP: 118/68   Pulse: 72   Resp: 16     Wt Readings from Last 3 Encounters:   03/27/19 72 1 kg (159 lb)   08/31/18 71 8 kg (158 lb 3 2 oz)   08/09/17 71 7 kg (158 lb)           ALLERGIES:  No Known Allergies    Current Medications     Current Outpatient Medications   Medication Sig Dispense Refill    cholecalciferol (VITAMIN D3) 1,000 units tablet Take 1 tablet (1,000 Units total) by mouth daily 30 tablet 0    diphenhydrAMINE-acetaminophen (TYLENOL PM EXTRA STRENGTH)  MG TABS Take by mouth      lisinopril (ZESTRIL) 10 mg tablet Take 1 tablet (10 mg total) by mouth daily 90 tablet 3    LORazepam (ATIVAN) 0 5 mg tablet Take 1 tablet (0 5 mg total) by mouth as needed for anxiety 30 tablet 0    meloxicam (MOBIC) 7 5 mg tablet Take 1 tablet (7 5 mg total) by mouth daily Take with food 90 tablet 1    omeprazole (PRILOSEC OTC) 20 MG tablet Take by mouth       No current facility-administered medications for this visit  Health Maintenance     Health Maintenance   Topic Date Due    BMI: Followup Plan  09/04/1975    DTaP,Tdap,and Td Vaccines (1 - Tdap) 09/04/1978    INFLUENZA VACCINE  07/01/2018    MAMMOGRAM  03/27/2019    Hepatitis C Screening  04/26/2019 (Originally 1955)    Depression Screening PHQ  03/27/2020    BMI: Adult  03/27/2020    PAP SMEAR  05/01/2020    CRC Screening: Colonoscopy  12/14/2020    HEPATITIS B VACCINES  Aged Out       There is no immunization history on file for this patient      Karen Oneil MD

## 2019-05-12 DIAGNOSIS — M19.90 ARTHRITIS: ICD-10-CM

## 2019-05-13 RX ORDER — MELOXICAM 7.5 MG/1
TABLET ORAL
Qty: 90 TABLET | Refills: 1 | Status: SHIPPED | OUTPATIENT
Start: 2019-05-13 | End: 2019-11-19 | Stop reason: SDUPTHER

## 2019-08-12 DIAGNOSIS — I10 ESSENTIAL HYPERTENSION: ICD-10-CM

## 2019-08-12 RX ORDER — LISINOPRIL 10 MG/1
TABLET ORAL
Qty: 90 TABLET | Refills: 3 | Status: SHIPPED | OUTPATIENT
Start: 2019-08-12 | End: 2019-11-19 | Stop reason: SDUPTHER

## 2019-08-12 NOTE — TELEPHONE ENCOUNTER
Lisinopril was reordered patient needs appointment the next 1-2 months for follow-up of her hypertension

## 2019-11-09 DIAGNOSIS — M19.90 ARTHRITIS: ICD-10-CM

## 2019-11-11 RX ORDER — MELOXICAM 7.5 MG/1
TABLET ORAL
Qty: 90 TABLET | Refills: 4 | OUTPATIENT
Start: 2019-11-11

## 2019-11-18 PROBLEM — M23.8X9 DEFICIENCY OF ANTERIOR CRUCIATE LIGAMENT: Status: ACTIVE | Noted: 2019-11-18

## 2019-11-18 PROBLEM — M93.1 KIENBOCK'S DISEASE OF LUNATE BONE OF RIGHT WRIST IN ADULT: Status: ACTIVE | Noted: 2019-07-31

## 2019-11-18 PROBLEM — M25.469 KNEE JOINT EFFUSION: Status: ACTIVE | Noted: 2019-11-18

## 2019-11-18 PROBLEM — S82.143A FRACTURE OF TIBIAL PLATEAU: Status: ACTIVE | Noted: 2019-11-18

## 2019-11-18 PROBLEM — M25.569 KNEE PAIN: Status: ACTIVE | Noted: 2019-11-18

## 2019-11-19 ENCOUNTER — OFFICE VISIT (OUTPATIENT)
Dept: FAMILY MEDICINE CLINIC | Facility: CLINIC | Age: 64
End: 2019-11-19

## 2019-11-19 VITALS
DIASTOLIC BLOOD PRESSURE: 64 MMHG | HEART RATE: 80 BPM | HEIGHT: 63 IN | WEIGHT: 159 LBS | BODY MASS INDEX: 28.17 KG/M2 | SYSTOLIC BLOOD PRESSURE: 104 MMHG

## 2019-11-19 DIAGNOSIS — Z51.81 ENCOUNTER FOR MONITORING CHRONIC NSAID THERAPY: ICD-10-CM

## 2019-11-19 DIAGNOSIS — E55.9 VITAMIN D DEFICIENCY: ICD-10-CM

## 2019-11-19 DIAGNOSIS — F41.9 ANXIETY DISORDER, UNSPECIFIED TYPE: ICD-10-CM

## 2019-11-19 DIAGNOSIS — K21.9 GASTROESOPHAGEAL REFLUX DISEASE, ESOPHAGITIS PRESENCE NOT SPECIFIED: ICD-10-CM

## 2019-11-19 DIAGNOSIS — M19.90 ARTHRITIS: ICD-10-CM

## 2019-11-19 DIAGNOSIS — Z79.1 ENCOUNTER FOR MONITORING CHRONIC NSAID THERAPY: ICD-10-CM

## 2019-11-19 DIAGNOSIS — I10 ESSENTIAL HYPERTENSION: Primary | ICD-10-CM

## 2019-11-19 DIAGNOSIS — M25.50 ARTHRALGIA OF MULTIPLE JOINTS: ICD-10-CM

## 2019-11-19 PROBLEM — M25.569 KNEE PAIN: Status: RESOLVED | Noted: 2019-11-18 | Resolved: 2019-11-19

## 2019-11-19 PROBLEM — G56.01 CARPAL TUNNEL SYNDROME OF RIGHT WRIST: Status: RESOLVED | Noted: 2018-08-31 | Resolved: 2019-11-19

## 2019-11-19 PROCEDURE — 99214 OFFICE O/P EST MOD 30 MIN: CPT | Performed by: PHYSICIAN ASSISTANT

## 2019-11-19 RX ORDER — MELOXICAM 7.5 MG/1
7.5 TABLET ORAL DAILY
Qty: 90 TABLET | Refills: 1 | Status: SHIPPED | OUTPATIENT
Start: 2019-11-19 | End: 2020-05-18

## 2019-11-19 RX ORDER — LORAZEPAM 0.5 MG/1
0.5 TABLET ORAL AS NEEDED
Qty: 30 TABLET | Refills: 0 | Status: SHIPPED | OUTPATIENT
Start: 2019-11-19 | End: 2021-06-08 | Stop reason: SDUPTHER

## 2019-11-19 RX ORDER — LISINOPRIL 10 MG/1
10 TABLET ORAL DAILY
Qty: 90 TABLET | Refills: 3 | Status: SHIPPED | OUTPATIENT
Start: 2019-11-19 | End: 2021-01-10

## 2019-11-19 RX ORDER — ACETAMINOPHEN 500 MG
500 TABLET ORAL EVERY 6 HOURS PRN
COMMUNITY

## 2019-11-19 NOTE — PATIENT INSTRUCTIONS
Problem List Items Addressed This Visit        Digestive    GERD (gastroesophageal reflux disease)       Cardiovascular and Mediastinum    Hypertension - Primary       Blood pressure very stable continue current medication  Relevant Medications    lisinopril (ZESTRIL) 10 mg tablet    Other Relevant Orders    Comprehensive metabolic panel       Musculoskeletal and Integument    Arthritis    Relevant Medications    LORazepam (ATIVAN) 0 5 mg tablet    meloxicam (MOBIC) 7 5 mg tablet       Other    Anxiety disorder     Pt  given refill of ativan which she takes very sparingly  She has used less than 30 in 1 5 years  PMED check and appropriate  Relevant Medications    LORazepam (ATIVAN) 0 5 mg tablet    Arthralgia of multiple joints     Pt takes mobic daily 7 5  Continue to monitor  Vitamin D deficiency     Check D leel with next labs            Relevant Orders    Vitamin D 25 hydroxy    Encounter for monitoring chronic NSAID therapy

## 2019-11-19 NOTE — PROGRESS NOTES
Assessment and Plan:    Problem List Items Addressed This Visit        Digestive    GERD (gastroesophageal reflux disease)       Cardiovascular and Mediastinum    Hypertension - Primary       Blood pressure very stable continue current medication  Relevant Medications    lisinopril (ZESTRIL) 10 mg tablet    Other Relevant Orders    Comprehensive metabolic panel       Musculoskeletal and Integument    Arthritis    Relevant Medications    LORazepam (ATIVAN) 0 5 mg tablet    meloxicam (MOBIC) 7 5 mg tablet       Other    Anxiety disorder     Pt  given refill of ativan which she takes very sparingly  She has used less than 30 in 1 5 years  PMED check and appropriate  Relevant Medications    LORazepam (ATIVAN) 0 5 mg tablet    Arthralgia of multiple joints     Pt takes mobic daily 7 5  Continue to monitor  Vitamin D deficiency     Check D leel with next labs  Relevant Orders    Vitamin D 25 hydroxy    Encounter for monitoring chronic NSAID therapy                 Diagnoses and all orders for this visit:    Essential hypertension  -     lisinopril (ZESTRIL) 10 mg tablet; Take 1 tablet (10 mg total) by mouth daily  -     Comprehensive metabolic panel; Future    Gastroesophageal reflux disease, esophagitis presence not specified    Vitamin D deficiency  -     Vitamin D 25 hydroxy; Future    Anxiety disorder, unspecified type    Encounter for monitoring chronic NSAID therapy    Arthralgia of multiple joints    Arthritis  -     LORazepam (ATIVAN) 0 5 mg tablet; Take 1 tablet (0 5 mg total) by mouth as needed for anxiety  -     meloxicam (MOBIC) 7 5 mg tablet; Take 1 tablet (7 5 mg total) by mouth daily Take with food    Other orders  -     acetaminophen (TYLENOL) 500 mg tablet; Take 500 mg by mouth every 6 (six) hours as needed              Subjective:      Patient ID: Ifeoma Cifuentes is a 60 y o  female      CC:    Chief Complaint   Patient presents with    Follow-up     patient is here for a 6 month f/u re: chronic medical conditions  ak       HPI:      DM patient last seen March of this year  Here to be reestablished with a new PCP  Patient uses Ativan only infrequently and has not gone through more than 30 and 1-1/2 years  She needs refill of her blood pressure medication and her Mobic that she takes daily for arthritis in several parts of her body  No complaints today  She just had surgery on her right wrist       The following portions of the patient's history were reviewed and updated as appropriate: allergies, current medications, past family history, past medical history, past social history, past surgical history and problem list       Review of Systems   Constitutional: Negative  HENT: Negative  Eyes: Negative  Respiratory: Negative  Cardiovascular: Negative  Gastrointestinal: Negative  Endocrine: Negative  Genitourinary: Negative  Musculoskeletal: Negative  Skin: Negative  Allergic/Immunologic: Negative  Neurological: Negative  Hematological: Negative  Psychiatric/Behavioral: Negative  Data to review:       Objective:    Vitals:    11/19/19 1803   BP: 104/64   Pulse: 80   Weight: 72 1 kg (159 lb)   Height: 5' 3" (1 6 m)        Physical Exam   Constitutional: She is oriented to person, place, and time  She appears well-developed and well-nourished  No distress  HENT:   Head: Normocephalic and atraumatic  Eyes: Conjunctivae are normal  Right eye exhibits no discharge  Left eye exhibits no discharge  Neck: Neck supple  Carotid bruit is not present  Cardiovascular: Normal rate, regular rhythm and normal heart sounds  Exam reveals no gallop and no friction rub  No murmur heard  Pulmonary/Chest: Effort normal and breath sounds normal  No respiratory distress  She has no wheezes  She has no rales  Neurological: She is alert and oriented to person, place, and time  Skin: Skin is warm and dry  She is not diaphoretic     Psychiatric: She has a normal mood and affect  Judgment normal    Nursing note and vitals reviewed

## 2019-11-19 NOTE — ASSESSMENT & PLAN NOTE
Pt  given refill of ativan which she takes very sparingly  She has used less than 30 in 1 5 years  PMED check and appropriate

## 2020-03-31 ENCOUNTER — TELEMEDICINE (OUTPATIENT)
Dept: FAMILY MEDICINE CLINIC | Facility: CLINIC | Age: 65
End: 2020-03-31
Payer: COMMERCIAL

## 2020-03-31 VITALS
DIASTOLIC BLOOD PRESSURE: 77 MMHG | HEART RATE: 108 BPM | HEIGHT: 63 IN | SYSTOLIC BLOOD PRESSURE: 128 MMHG | BODY MASS INDEX: 28 KG/M2 | WEIGHT: 158 LBS

## 2020-03-31 DIAGNOSIS — Z00.00 HEALTHCARE MAINTENANCE: ICD-10-CM

## 2020-03-31 DIAGNOSIS — M19.90 ARTHRITIS: ICD-10-CM

## 2020-03-31 DIAGNOSIS — Z79.1 ENCOUNTER FOR MONITORING CHRONIC NSAID THERAPY: ICD-10-CM

## 2020-03-31 DIAGNOSIS — E55.9 VITAMIN D DEFICIENCY: ICD-10-CM

## 2020-03-31 DIAGNOSIS — Z51.81 ENCOUNTER FOR MONITORING CHRONIC NSAID THERAPY: ICD-10-CM

## 2020-03-31 DIAGNOSIS — K21.9 GASTROESOPHAGEAL REFLUX DISEASE, ESOPHAGITIS PRESENCE NOT SPECIFIED: ICD-10-CM

## 2020-03-31 DIAGNOSIS — Z12.39 BREAST CANCER SCREENING: ICD-10-CM

## 2020-03-31 DIAGNOSIS — F41.9 ANXIETY DISORDER, UNSPECIFIED TYPE: ICD-10-CM

## 2020-03-31 DIAGNOSIS — I10 ESSENTIAL HYPERTENSION: Primary | ICD-10-CM

## 2020-03-31 DIAGNOSIS — E78.5 HYPERLIPIDEMIA, UNSPECIFIED HYPERLIPIDEMIA TYPE: ICD-10-CM

## 2020-03-31 PROCEDURE — 3074F SYST BP LT 130 MM HG: CPT | Performed by: PHYSICIAN ASSISTANT

## 2020-03-31 PROCEDURE — 99214 OFFICE O/P EST MOD 30 MIN: CPT | Performed by: PHYSICIAN ASSISTANT

## 2020-03-31 PROCEDURE — 3008F BODY MASS INDEX DOCD: CPT | Performed by: PHYSICIAN ASSISTANT

## 2020-03-31 PROCEDURE — 3078F DIAST BP <80 MM HG: CPT | Performed by: PHYSICIAN ASSISTANT

## 2020-03-31 NOTE — ASSESSMENT & PLAN NOTE
CMP shows kidney function stable with a GFR of 63  No other anti-inflammatories such as ibuprofen Advil or Motrin should be used if patient takes a Mobic in the same day

## 2020-03-31 NOTE — PROGRESS NOTES
Virtual Brief Visit    Problem List Items Addressed This Visit        Digestive    GERD (gastroesophageal reflux disease)     Stables long as patient takes her omeprazole daily over-the-counter  Cardiovascular and Mediastinum    Hypertension - Primary     Stable continue current medication recheck 6 months in the office but hopefully  Musculoskeletal and Integument    Arthritis    Relevant Orders    Comprehensive metabolic panel       Other    Anxiety disorder     Stable with only rarely as needed use of lorazepam   Refills not needed at this time  Hyperlipidemia     Lipid panel done one year ago and was excellent  Check again in 6 months with CMP  Relevant Orders    Lipid panel    Vitamin D deficiency     Vit D level was great at 47  Continue daily supplementation and recheck in one year  Encounter for monitoring chronic NSAID therapy     CMP shows kidney function stable with a GFR of 63  No other anti-inflammatories such as ibuprofen Advil or Motrin should be used if patient takes a Mobic in the same day  Relevant Orders    Comprehensive metabolic panel    Healthcare maintenance     Mammogram due order given  It will be mailed to patient  Other Visit Diagnoses     Breast cancer screening        Relevant Orders    Mammo screening bilateral w 3d & cad          BMI Counseling: Body mass index is 27 99 kg/m²  The BMI is above normal  Nutrition recommendations include decreasing portion sizes, encouraging healthy choices of fruits and vegetables, decreasing fast food intake, consuming healthier snacks, limiting drinks that contain sugar, moderation in carbohydrate intake, increasing intake of lean protein, reducing intake of saturated and trans fat and reducing intake of cholesterol  Exercise recommendations include exercising 3-5 times per week  No pharmacotherapy was ordered  Reason for visit is follow up to tests done      Encounter provider Kayla Dalton PA-C    Provider located at [address] [place] [address] PRIMARY CARE  [address] [place] [address]      Recent Visits  No visits were found meeting these conditions  Showing recent visits within past 7 days and meeting all other requirements     Today's Visits  Date Type Provider Dept   03/31/20 [address] [facility]   Showing today's visits and meeting all other requirements     Future Appointments  Date Type Provider Dept   03/31/20 Telemedicine Patsy Phoenix PA-C [facility]   Showing future appointments within next 150 days and meeting all other requirements        After connecting through telephone, the patient was identified by name and date of birth  Harriett Marmolejo was informed that this is a telemedicine visit and that the visit is being conducted through telephone  My office door was closed  No one else was in the room  She acknowledged consent and understanding of privacy and security of the video platform  The patient has agreed to participate and understands they can discontinue the visit at any time  Patient is aware this is a billable service  Subjective  Harriett Marmolejo is a 60 y o  female pt  Past Medical History:   Diagnosis Date    Diverticulosis     of colon  Last assessed: 6/29/12    Hypertension        Past Surgical History:   Procedure Laterality Date    BREAST BIOPSY Left 05/2005    Percutaneous Needle Core  Dr Martin Casper  05/2007    MMR  Polyp benign  Dr Rusty Hamilton      Surgically induced  By dilation and curettage  26 yo    OTHER SURGICAL HISTORY  05/2005    Anterior Colporrhaphy (for pelvic relaxation)  Dr Raji Son Bilateral     25 yo    URETHRAL SLING  05/2005    Mid  TOT   Dr Radha Cho (c ant colporr, Prolift, extraperitoneal colpopexy    UTERINE SUSPENSION  05/2005    Prolift, mesh  Dr Zamzam Portillo       Current Outpatient Medications   Medication Sig Dispense Refill    acetaminophen (TYLENOL) 500 mg tablet Take 500 mg by mouth every 6 (six) hours as needed      cholecalciferol (VITAMIN D3) 1,000 units tablet Take 1 tablet (1,000 Units total) by mouth daily 30 tablet 0    diphenhydrAMINE-acetaminophen (TYLENOL PM EXTRA STRENGTH)  MG TABS Take by mouth      lisinopril (ZESTRIL) 10 mg tablet Take 1 tablet (10 mg total) by mouth daily 90 tablet 3    LORazepam (ATIVAN) 0 5 mg tablet Take 1 tablet (0 5 mg total) by mouth as needed for anxiety 30 tablet 0    meloxicam (MOBIC) 7 5 mg tablet Take 1 tablet (7 5 mg total) by mouth daily Take with food 90 tablet 1    omeprazole (PRILOSEC OTC) 20 MG tablet Take by mouth       No current facility-administered medications for this visit  No Known Allergies    Review of Systems   Constitutional: Negative  HENT: Negative  Eyes: Negative  Respiratory: Negative  Cardiovascular: Negative  Gastrointestinal: Negative  Endocrine: Negative  Genitourinary: Negative  Musculoskeletal: Negative  Skin: Negative  Allergic/Immunologic: Negative  Neurological: Negative  Hematological: Negative  Psychiatric/Behavioral: Negative  I spent 15 minutes with the patient during this visit  KRISTOFER Souza is here for chronic conditions f/u including the diagnosis of Essential hypertension  (primary encounter diagnosis)  Gastroesophageal reflux disease, esophagitis presence not specified  Arthritis  Anxiety disorder, unspecified type  Vitamin d deficiency  Hyperlipidemia, unspecified hyperlipidemia type  Encounter for monitoring chronic nsaid therapy   Pt  states they are taking all medications as directed without complaints or side effects  Pt  had labs done prior to today's visit which included blood work from [facility] that included a normal CMP with a glucose 97 GFR of 63 vitamin-D of 47  Patient states that she is doing very well without fever nausea vomiting or diarrhea  She is taking to the corn T needing and staying at home order from the government  She does have a home blood pressure cuff that is 22years old but is relatively accurate and today's reading she got while on the phone with me was 128/77 with a pulse of 1 weight  She was running around because she was late coming home from grocery shopping and missed a call from us and just got home and I called  Patient does not need any refills at this time  She continues to take her over-the-counter Prilosec daily due to Mobic use  She states that she sometimes uses an anti-inflammatory and I told her that she should not and stick with Tylenol only on top of her Mobic as they are all anti-inflammatories and hard on the kidneys  Data to review:          COVID 19 Instructions    Linh Oates was advised to limit contact with others to essential tasks such as getting food, medications, and medical care  Proper handwashing reviewed, and Hand sanitzer when washing is not available  If the patient develops symptoms of COVID 19, the patient should call the office as soon as possible  Patient was offered video visit today  Patient refused video visit  Examination today was by video/telephone visit  Observational measures that are included were noted on video or by patient/guardian description  Vital signs are include in this visit were obtained by the patient, using their own equipment at home

## 2020-05-17 DIAGNOSIS — M19.90 ARTHRITIS: ICD-10-CM

## 2020-05-18 RX ORDER — MELOXICAM 7.5 MG/1
TABLET ORAL
Qty: 90 TABLET | Refills: 3 | Status: SHIPPED | OUTPATIENT
Start: 2020-05-18 | End: 2021-05-13 | Stop reason: SDUPTHER

## 2021-01-10 DIAGNOSIS — I10 ESSENTIAL HYPERTENSION: ICD-10-CM

## 2021-01-10 RX ORDER — LISINOPRIL 10 MG/1
TABLET ORAL
Qty: 90 TABLET | Refills: 0 | Status: SHIPPED | OUTPATIENT
Start: 2021-01-10 | End: 2021-04-11

## 2021-03-30 DIAGNOSIS — Z23 ENCOUNTER FOR IMMUNIZATION: ICD-10-CM

## 2021-04-05 ENCOUNTER — IMMUNIZATIONS (OUTPATIENT)
Dept: FAMILY MEDICINE CLINIC | Facility: HOSPITAL | Age: 66
End: 2021-04-05

## 2021-04-05 DIAGNOSIS — Z23 ENCOUNTER FOR IMMUNIZATION: Primary | ICD-10-CM

## 2021-04-05 PROCEDURE — 91301 SARS-COV-2 / COVID-19 MRNA VACCINE (MODERNA) 100 MCG: CPT

## 2021-04-05 PROCEDURE — 0011A SARS-COV-2 / COVID-19 MRNA VACCINE (MODERNA) 100 MCG: CPT

## 2021-04-10 DIAGNOSIS — I10 ESSENTIAL HYPERTENSION: ICD-10-CM

## 2021-04-11 RX ORDER — LISINOPRIL 10 MG/1
TABLET ORAL
Qty: 90 TABLET | Refills: 3 | Status: SHIPPED | OUTPATIENT
Start: 2021-04-11 | End: 2022-02-14 | Stop reason: SDUPTHER

## 2021-05-06 ENCOUNTER — IMMUNIZATIONS (OUTPATIENT)
Dept: FAMILY MEDICINE CLINIC | Facility: HOSPITAL | Age: 66
End: 2021-05-06

## 2021-05-06 DIAGNOSIS — Z23 ENCOUNTER FOR IMMUNIZATION: Primary | ICD-10-CM

## 2021-05-06 PROCEDURE — 91301 SARS-COV-2 / COVID-19 MRNA VACCINE (MODERNA) 100 MCG: CPT

## 2021-05-06 PROCEDURE — 0012A SARS-COV-2 / COVID-19 MRNA VACCINE (MODERNA) 100 MCG: CPT

## 2021-05-13 DIAGNOSIS — Z11.59 NEED FOR HEPATITIS C SCREENING TEST: Primary | ICD-10-CM

## 2021-05-13 DIAGNOSIS — Z11.4 SCREENING FOR HIV (HUMAN IMMUNODEFICIENCY VIRUS): ICD-10-CM

## 2021-05-13 DIAGNOSIS — E78.5 HYPERLIPIDEMIA, UNSPECIFIED HYPERLIPIDEMIA TYPE: ICD-10-CM

## 2021-05-13 DIAGNOSIS — E55.9 VITAMIN D DEFICIENCY: ICD-10-CM

## 2021-05-13 DIAGNOSIS — M19.90 ARTHRITIS: ICD-10-CM

## 2021-05-13 DIAGNOSIS — I10 ESSENTIAL HYPERTENSION: ICD-10-CM

## 2021-05-13 RX ORDER — MELOXICAM 7.5 MG/1
TABLET ORAL
Qty: 90 TABLET | Refills: 3 | OUTPATIENT
Start: 2021-05-13

## 2021-05-13 RX ORDER — MELOXICAM 7.5 MG/1
7.5 TABLET ORAL DAILY
Qty: 30 TABLET | Refills: 0 | Status: SHIPPED | OUTPATIENT
Start: 2021-05-13 | End: 2021-06-22 | Stop reason: SDUPTHER

## 2021-05-13 NOTE — TELEPHONE ENCOUNTER
Pt may have 30 if needed but she has not been seen in the office for over a year and her last visit was virtual  I placed lab orders and pt needs to get them done and then OV to review before further fills

## 2021-06-01 ENCOUNTER — RA CDI HCC (OUTPATIENT)
Dept: OTHER | Facility: HOSPITAL | Age: 66
End: 2021-06-01

## 2021-06-01 NOTE — PROGRESS NOTES
[facility] 75  coding opportunities          Chart reviewed, no opportunity found: CHART REVIEWED, NO OPPORTUNITY FOUND              Patients insurance company: [employer] ([employer] and Commercial)

## 2021-06-02 ENCOUNTER — APPOINTMENT (OUTPATIENT)
Dept: LAB | Facility: CLINIC | Age: 66
End: 2021-06-02
Payer: COMMERCIAL

## 2021-06-02 DIAGNOSIS — Z11.59 NEED FOR HEPATITIS C SCREENING TEST: ICD-10-CM

## 2021-06-02 DIAGNOSIS — Z11.4 SCREENING FOR HIV (HUMAN IMMUNODEFICIENCY VIRUS): ICD-10-CM

## 2021-06-02 LAB
25(OH)D3 SERPL-MCNC: 65.4 NG/ML (ref 30–100)
ALBUMIN SERPL BCP-MCNC: 3.9 G/DL (ref 3.5–5)
ALP SERPL-CCNC: 95 U/L (ref 46–116)
ALT SERPL W P-5'-P-CCNC: 21 U/L (ref 12–78)
ANION GAP SERPL CALCULATED.3IONS-SCNC: 6 MMOL/L (ref 4–13)
AST SERPL W P-5'-P-CCNC: 9 U/L (ref 5–45)
BILIRUB SERPL-MCNC: 0.44 MG/DL (ref 0.2–1)
BUN SERPL-MCNC: 24 MG/DL (ref 5–25)
CALCIUM SERPL-MCNC: 9 MG/DL (ref 8.3–10.1)
CHLORIDE SERPL-SCNC: 110 MMOL/L (ref 100–108)
CHOLEST SERPL-MCNC: 157 MG/DL (ref 50–200)
CO2 SERPL-SCNC: 26 MMOL/L (ref 21–32)
CREAT SERPL-MCNC: 1.02 MG/DL (ref 0.6–1.3)
GFR SERPL CREATININE-BSD FRML MDRD: 59 ML/MIN/1.73SQ M
GLUCOSE P FAST SERPL-MCNC: 96 MG/DL (ref 65–99)
HCV AB SER QL: NORMAL
HDLC SERPL-MCNC: 53 MG/DL
LDLC SERPL CALC-MCNC: 84 MG/DL (ref 0–100)
POTASSIUM SERPL-SCNC: 4.2 MMOL/L (ref 3.5–5.3)
PROT SERPL-MCNC: 7.8 G/DL (ref 6.4–8.2)
SODIUM SERPL-SCNC: 142 MMOL/L (ref 136–145)
TRIGL SERPL-MCNC: 98 MG/DL

## 2021-06-02 PROCEDURE — 80053 COMPREHEN METABOLIC PANEL: CPT | Performed by: PHYSICIAN ASSISTANT

## 2021-06-02 PROCEDURE — 80061 LIPID PANEL: CPT | Performed by: PHYSICIAN ASSISTANT

## 2021-06-02 PROCEDURE — 87389 HIV-1 AG W/HIV-1&-2 AB AG IA: CPT

## 2021-06-02 PROCEDURE — 82306 VITAMIN D 25 HYDROXY: CPT | Performed by: PHYSICIAN ASSISTANT

## 2021-06-02 PROCEDURE — 86803 HEPATITIS C AB TEST: CPT

## 2021-06-02 PROCEDURE — 36415 COLL VENOUS BLD VENIPUNCTURE: CPT | Performed by: PHYSICIAN ASSISTANT

## 2021-06-03 LAB — HIV 1+2 AB+HIV1 P24 AG SERPL QL IA: NORMAL

## 2021-06-08 ENCOUNTER — OFFICE VISIT (OUTPATIENT)
Dept: FAMILY MEDICINE CLINIC | Facility: CLINIC | Age: 66
End: 2021-06-08
Payer: COMMERCIAL

## 2021-06-08 VITALS
HEIGHT: 63 IN | RESPIRATION RATE: 16 BRPM | DIASTOLIC BLOOD PRESSURE: 62 MMHG | SYSTOLIC BLOOD PRESSURE: 108 MMHG | WEIGHT: 158 LBS | BODY MASS INDEX: 28 KG/M2 | HEART RATE: 84 BPM

## 2021-06-08 DIAGNOSIS — Z00.00 HEALTHCARE MAINTENANCE: ICD-10-CM

## 2021-06-08 DIAGNOSIS — K63.5 POLYP OF COLON, UNSPECIFIED PART OF COLON, UNSPECIFIED TYPE: ICD-10-CM

## 2021-06-08 DIAGNOSIS — E78.5 HYPERLIPIDEMIA, UNSPECIFIED HYPERLIPIDEMIA TYPE: ICD-10-CM

## 2021-06-08 DIAGNOSIS — E55.9 VITAMIN D DEFICIENCY: ICD-10-CM

## 2021-06-08 DIAGNOSIS — Z12.31 ENCOUNTER FOR SCREENING MAMMOGRAM FOR BREAST CANCER: ICD-10-CM

## 2021-06-08 DIAGNOSIS — Z01.419 ENCOUNTER FOR GYNECOLOGICAL EXAMINATION: ICD-10-CM

## 2021-06-08 DIAGNOSIS — I10 ESSENTIAL HYPERTENSION: ICD-10-CM

## 2021-06-08 DIAGNOSIS — M19.90 ARTHRITIS: ICD-10-CM

## 2021-06-08 DIAGNOSIS — M25.50 ARTHRALGIA OF MULTIPLE JOINTS: ICD-10-CM

## 2021-06-08 DIAGNOSIS — F41.9 ANXIETY DISORDER, UNSPECIFIED TYPE: ICD-10-CM

## 2021-06-08 DIAGNOSIS — K21.9 GASTROESOPHAGEAL REFLUX DISEASE, UNSPECIFIED WHETHER ESOPHAGITIS PRESENT: Primary | ICD-10-CM

## 2021-06-08 PROCEDURE — 99214 OFFICE O/P EST MOD 30 MIN: CPT | Performed by: PHYSICIAN ASSISTANT

## 2021-06-08 PROCEDURE — 3008F BODY MASS INDEX DOCD: CPT | Performed by: PHYSICIAN ASSISTANT

## 2021-06-08 PROCEDURE — 1036F TOBACCO NON-USER: CPT | Performed by: PHYSICIAN ASSISTANT

## 2021-06-08 PROCEDURE — 3725F SCREEN DEPRESSION PERFORMED: CPT | Performed by: PHYSICIAN ASSISTANT

## 2021-06-08 RX ORDER — OMEPRAZOLE 20 MG/1
20 TABLET, DELAYED RELEASE ORAL DAILY
Qty: 90 TABLET | Refills: 3 | Status: SHIPPED | OUTPATIENT
Start: 2021-06-08 | End: 2021-06-08 | Stop reason: SDUPTHER

## 2021-06-08 RX ORDER — LORAZEPAM 0.5 MG/1
0.5 TABLET ORAL AS NEEDED
Qty: 30 TABLET | Refills: 0 | Status: SHIPPED | OUTPATIENT
Start: 2021-06-08

## 2021-06-08 RX ORDER — OMEPRAZOLE 20 MG/1
20 TABLET, DELAYED RELEASE ORAL DAILY
Qty: 90 TABLET | Refills: 3 | Status: SHIPPED | OUTPATIENT
Start: 2021-06-08 | End: 2021-06-14 | Stop reason: RX

## 2021-06-08 NOTE — ASSESSMENT & PLAN NOTE
Blood work up-to-date  HIV and hepatitis-C negative  Mammogram ordered  Shingles vaccine recommended  COVID vaccines completed

## 2021-06-08 NOTE — PATIENT INSTRUCTIONS
Problem List Items Addressed This Visit        Digestive    GERD (gastroesophageal reflux disease) - Primary       Stable on daily Prilosec  Patient encouraged to take every other day if able  She states that she would likely not need PPI if she did not need the Mobic  Relevant Medications    omeprazole (PriLOSEC OTC) 20 MG tablet    Colon polyps     First and only colonoscopy done 2010 with one polyp and pt never went for repeat which was due 2 years later  Info given for pt to return with Dr Elicia Cobian  Relevant Orders    Ambulatory referral to General Surgery       Cardiovascular and Mediastinum    Hypertension       Very stable continue current medication  Relevant Orders    Comprehensive metabolic panel       Other    Anxiety disorder       Stable on as needed Ativan  Arthralgia of multiple joints       Continues to be of help with 7 5 Mobic daily  Kidney function within normal limits  Pt plans to retire in the future and may not need mobic daily or at all  Hyperlipidemia       Patient is not on any medication for cholesterol and his LDL is 84 with a triglyceride of 98  Very stable check yearly  Relevant Orders    Lipid Panel with Direct LDL reflex    Vitamin D deficiency       Vitamin-D level very good at 67 continue supplementation recheck 1 year  Relevant Orders    Vitamin D 25 hydroxy    Healthcare maintenance       Blood work up-to-date  HIV and hepatitis-C negative  Mammogram ordered  Shingles vaccine recommended  COVID vaccines completed             Other Visit Diagnoses     Encounter for screening mammogram for breast cancer        Relevant Orders    Mammo screening bilateral w 3d & cad    Encounter for gynecological examination        Relevant Orders    Ambulatory referral to Gynecology

## 2021-06-08 NOTE — ASSESSMENT & PLAN NOTE
Stable on daily Prilosec  Patient encouraged to take every other day if able  She states that she would likely not need PPI if she did not need the Mobic

## 2021-06-08 NOTE — PROGRESS NOTES
Assessment and Plan:    Problem List Items Addressed This Visit        Digestive    GERD (gastroesophageal reflux disease) - Primary       Stable on daily Prilosec  Patient encouraged to take every other day if able  She states that she would likely not need PPI if she did not need the Mobic  Relevant Medications    omeprazole (PriLOSEC OTC) 20 MG tablet    Colon polyps     First and only colonoscopy done 2010 with one polyp and pt never went for repeat which was due 2 years later  Info given for pt to return with Dr Michelle Cisneros  Relevant Orders    Ambulatory referral to General Surgery       Cardiovascular and Mediastinum    Hypertension       Very stable continue current medication  Relevant Orders    Comprehensive metabolic panel       Musculoskeletal and Integument    Arthritis    Relevant Medications    LORazepam (ATIVAN) 0 5 mg tablet       Other    Anxiety disorder       Stable on as needed Ativan  Relevant Medications    LORazepam (ATIVAN) 0 5 mg tablet    Arthralgia of multiple joints       Continues to be of help with 7 5 Mobic daily  Kidney function within normal limits  Pt plans to retire in the future and may not need mobic daily or at all  Hyperlipidemia       Patient is not on any medication for cholesterol and his LDL is 84 with a triglyceride of 98  Very stable check yearly  Relevant Orders    Lipid Panel with Direct LDL reflex    Vitamin D deficiency       Vitamin-D level very good at 67 continue supplementation recheck 1 year  Relevant Orders    Vitamin D 25 hydroxy    Healthcare maintenance       Blood work up-to-date  HIV and hepatitis-C negative  Mammogram ordered  Shingles vaccine recommended  COVID vaccines completed             Other Visit Diagnoses     Encounter for screening mammogram for breast cancer        Relevant Orders    Mammo screening bilateral w 3d & cad    Encounter for gynecological examination        Relevant Orders    Ambulatory referral to Gynecology                 Diagnoses and all orders for this visit:    Gastroesophageal reflux disease, unspecified whether esophagitis present  -     Discontinue: omeprazole (PriLOSEC OTC) 20 MG tablet; Take 1 tablet (20 mg total) by mouth daily  -     omeprazole (PriLOSEC OTC) 20 MG tablet; Take 1 tablet (20 mg total) by mouth daily    Essential hypertension  -     Comprehensive metabolic panel; Future    Anxiety disorder, unspecified type    Vitamin D deficiency  -     Vitamin D 25 hydroxy; Future    Hyperlipidemia, unspecified hyperlipidemia type  -     Lipid Panel with Direct LDL reflex; Future    Encounter for screening mammogram for breast cancer  -     Mammo screening bilateral w 3d & cad; Future    Healthcare maintenance    Arthralgia of multiple joints    Encounter for gynecological examination  -     Ambulatory referral to Gynecology; Future    Polyp of colon, unspecified part of colon, unspecified type  -     Ambulatory referral to General Surgery; Future    Arthritis  -     LORazepam (ATIVAN) 0 5 mg tablet; Take 1 tablet (0 5 mg total) by mouth as needed for anxiety              Subjective:      Patient ID: Evelin Brewer is a 63 y o  female  CC:    Chief Complaint   Patient presents with    Follow-up     Patient present today for her routine follow up  HPI:      Evelin Brewer is here for chronic conditions f/u including the diagnosis of Gastroesophageal reflux disease, unspecified whether esophagitis present  (primary encounter diagnosis)  Essential hypertension  Anxiety disorder, unspecified type  Vitamin d deficiency  Hyperlipidemia, unspecified hyperlipidemia type  Encounter for screening mammogram for breast cancer   Pt  states they are taking all medications as directed without complaints or side effects   Pt  had labs done prior to today's visit which included Recent Results (from the past 672 hour(s))  -Comprehensive metabolic panel  Collection Time: 06/02/21  7:55 AM       Result                      Value             Ref Range           Sodium                      142               136 - 145 mm*       Potassium                   4 2               3 5 - 5 3 mm*       Chloride                    110 (H)           100 - 108 mm*       CO2                         26                21 - 32 mmol*       ANION GAP                   6                 4 - 13 mmol/L       BUN                         24                5 - 25 mg/dL        Creatinine                  1 02              0 60 - 1 30 *       Glucose, Fasting            96                65 - 99 mg/dL       Calcium                     9 0               8 3 - 10 1 m*       AST                         9                 5 - 45 U/L          ALT                         21                12 - 78 U/L         Alkaline Phosphatase        95                46 - 116 U/L        Total Protein               7 8               6 4 - 8 2 g/*       Albumin                     3 9               3 5 - 5 0 g/*       Total Bilirubin             0 44              0 20 - 1 00 *       eGFR                        59                ml/min/1 73s*  -Vitamin D 25 hydroxy  Collection Time: 06/02/21  7:55 AM       Result                      Value             Ref Range           Vit D, 25-Hydroxy           65 4              30 0 - 100 0*  -Lipid Panel with Direct LDL reflex  Collection Time: 06/02/21  7:55 AM       Result                      Value             Ref Range           Cholesterol                 157               50 - 200 mg/*       Triglycerides               98                <=150 mg/dL         HDL, Direct                 53                >=40 mg/dL          LDL Calculated              84                0 - 100 mg/dL  -Hepatitis C Antibody ([facility])  Collection Time: 06/02/21  7:55 AM       Result                      Value             Ref Range           Hepatitis C Ab              Non-reactive      Non-reactive   -HIV 1/2 Antigen/Antibody (4th Generation) w Reflex [facility]  Collection Time: 06/02/21  7:55 AM       Result                      Value             Ref Range           HIV-1/HIV-2 Ab              Non-Reactive      Non-Reactive         The following portions of the patient's history were reviewed and updated as appropriate: allergies, current medications, past family history, past medical history, past social history, past surgical history and problem list       Review of Systems   Constitutional: Negative  HENT: Negative  Eyes: Negative  Respiratory: Negative  Cardiovascular: Negative  Gastrointestinal: Negative  Endocrine: Negative  Genitourinary: Negative  Musculoskeletal: Negative  Skin: Negative  Allergic/Immunologic: Negative  Neurological: Negative  Hematological: Negative  Psychiatric/Behavioral: Negative  Data to review:       Objective:    Vitals:    06/08/21 1828   BP: 108/62   BP Location: Left arm   Patient Position: Sitting   Cuff Size: Large   Pulse: 84   Resp: 16   Weight: 71 7 kg (158 lb)   Height: 5' 3" (1 6 m)        Physical Exam  Vitals signs and nursing note reviewed  Constitutional:       Appearance: Normal appearance  She is well-developed  HENT:      Head: Normocephalic and atraumatic  Eyes:      General: Lids are normal       Conjunctiva/sclera: Conjunctivae normal       Pupils: Pupils are equal, round, and reactive to light  Cardiovascular:      Rate and Rhythm: Normal rate and regular rhythm  Heart sounds: No murmur  Pulmonary:      Effort: Pulmonary effort is normal       Breath sounds: Normal breath sounds  Skin:     General: Skin is warm and dry  Neurological:      General: No focal deficit present  Mental Status: She is alert  Coordination: Coordination is intact  Psychiatric:         Mood and Affect: Mood normal          Behavior: Behavior normal  Behavior is cooperative  Thought Content:  Thought content normal          Judgment: Judgment normal            BMI Counseling: Body mass index is 27 99 kg/m²  The BMI is above normal  Nutrition recommendations include decreasing portion sizes, encouraging healthy choices of fruits and vegetables, decreasing fast food intake, consuming healthier snacks, limiting drinks that contain sugar, moderation in carbohydrate intake, increasing intake of lean protein, reducing intake of saturated and trans fat and reducing intake of cholesterol  Exercise recommendations include exercising 3-5 times per week  No pharmacotherapy was ordered

## 2021-06-08 NOTE — ASSESSMENT & PLAN NOTE
Continues to be of help with 7 5 Mobic daily  Kidney function within normal limits  Pt plans to retire in the future and may not need mobic daily or at all

## 2021-06-08 NOTE — ASSESSMENT & PLAN NOTE
First and only colonoscopy done 2010 with one polyp and pt never went for repeat which was due 2 years later  Info given for pt to return with Dr Alvarez Shown

## 2021-06-08 NOTE — ASSESSMENT & PLAN NOTE
Patient is not on any medication for cholesterol and his LDL is 84 with a triglyceride of 98  Very stable check yearly

## 2021-06-14 DIAGNOSIS — K21.9 GASTROESOPHAGEAL REFLUX DISEASE, UNSPECIFIED WHETHER ESOPHAGITIS PRESENT: Primary | ICD-10-CM

## 2021-06-14 RX ORDER — OMEPRAZOLE 20 MG/1
20 CAPSULE, DELAYED RELEASE ORAL
Qty: 90 CAPSULE | Refills: 3 | Status: SHIPPED | OUTPATIENT
Start: 2021-06-14 | End: 2022-02-14 | Stop reason: SDUPTHER

## 2021-06-14 NOTE — TELEPHONE ENCOUNTER
We received a fax that states the RX sent for Omeprazole was the OTC form (omeprazole (PriLOSEC OTC) 20 MG tablet ) I corrected and placed new RX for approval

## 2021-06-22 DIAGNOSIS — M19.90 ARTHRITIS: ICD-10-CM

## 2021-06-22 RX ORDER — MELOXICAM 7.5 MG/1
7.5 TABLET ORAL DAILY
Qty: 30 TABLET | Refills: 0 | Status: SHIPPED | OUTPATIENT
Start: 2021-06-22 | End: 2021-06-22 | Stop reason: SDUPTHER

## 2021-06-22 RX ORDER — MELOXICAM 7.5 MG/1
7.5 TABLET ORAL DAILY
Qty: 90 TABLET | Refills: 1 | Status: SHIPPED | OUTPATIENT
Start: 2021-06-22 | End: 2021-06-24

## 2021-12-01 DIAGNOSIS — M19.90 ARTHRITIS: ICD-10-CM

## 2021-12-01 RX ORDER — MELOXICAM 7.5 MG/1
TABLET ORAL
Qty: 90 TABLET | Refills: 3 | Status: SHIPPED | OUTPATIENT
Start: 2021-12-01 | End: 2022-06-14

## 2022-02-14 DIAGNOSIS — K21.9 GASTROESOPHAGEAL REFLUX DISEASE, UNSPECIFIED WHETHER ESOPHAGITIS PRESENT: ICD-10-CM

## 2022-02-14 DIAGNOSIS — I10 ESSENTIAL HYPERTENSION: ICD-10-CM

## 2022-02-14 RX ORDER — OMEPRAZOLE 20 MG/1
20 CAPSULE, DELAYED RELEASE ORAL
Qty: 90 CAPSULE | Refills: 1 | Status: SHIPPED | OUTPATIENT
Start: 2022-02-14 | End: 2022-06-14 | Stop reason: SDUPTHER

## 2022-02-14 RX ORDER — LISINOPRIL 10 MG/1
10 TABLET ORAL DAILY
Qty: 90 TABLET | Refills: 1 | Status: SHIPPED | OUTPATIENT
Start: 2022-02-14 | End: 2022-06-14 | Stop reason: SDUPTHER

## 2022-06-14 ENCOUNTER — OFFICE VISIT (OUTPATIENT)
Dept: FAMILY MEDICINE CLINIC | Facility: CLINIC | Age: 67
End: 2022-06-14
Payer: COMMERCIAL

## 2022-06-14 VITALS
SYSTOLIC BLOOD PRESSURE: 108 MMHG | WEIGHT: 162 LBS | HEIGHT: 63 IN | DIASTOLIC BLOOD PRESSURE: 64 MMHG | BODY MASS INDEX: 28.7 KG/M2 | HEART RATE: 101 BPM | OXYGEN SATURATION: 98 %

## 2022-06-14 DIAGNOSIS — Z79.1 ENCOUNTER FOR MONITORING CHRONIC NSAID THERAPY: ICD-10-CM

## 2022-06-14 DIAGNOSIS — K63.5 POLYP OF COLON, UNSPECIFIED PART OF COLON, UNSPECIFIED TYPE: ICD-10-CM

## 2022-06-14 DIAGNOSIS — I10 PRIMARY HYPERTENSION: ICD-10-CM

## 2022-06-14 DIAGNOSIS — E55.9 VITAMIN D DEFICIENCY: ICD-10-CM

## 2022-06-14 DIAGNOSIS — Z51.81 ENCOUNTER FOR MONITORING CHRONIC NSAID THERAPY: ICD-10-CM

## 2022-06-14 DIAGNOSIS — Z78.0 MENOPAUSE: ICD-10-CM

## 2022-06-14 DIAGNOSIS — K21.9 GASTROESOPHAGEAL REFLUX DISEASE, UNSPECIFIED WHETHER ESOPHAGITIS PRESENT: ICD-10-CM

## 2022-06-14 DIAGNOSIS — Z00.00 HEALTHCARE MAINTENANCE: ICD-10-CM

## 2022-06-14 DIAGNOSIS — E78.5 HYPERLIPIDEMIA, UNSPECIFIED HYPERLIPIDEMIA TYPE: ICD-10-CM

## 2022-06-14 DIAGNOSIS — Z12.31 ENCOUNTER FOR SCREENING MAMMOGRAM FOR MALIGNANT NEOPLASM OF BREAST: Primary | ICD-10-CM

## 2022-06-14 DIAGNOSIS — I10 ESSENTIAL HYPERTENSION: ICD-10-CM

## 2022-06-14 PROBLEM — Z86.16 HISTORY OF COVID-19: Status: ACTIVE | Noted: 2022-06-14

## 2022-06-14 PROCEDURE — 3725F SCREEN DEPRESSION PERFORMED: CPT | Performed by: PHYSICIAN ASSISTANT

## 2022-06-14 PROCEDURE — 1036F TOBACCO NON-USER: CPT | Performed by: PHYSICIAN ASSISTANT

## 2022-06-14 PROCEDURE — 99214 OFFICE O/P EST MOD 30 MIN: CPT | Performed by: PHYSICIAN ASSISTANT

## 2022-06-14 PROCEDURE — 3008F BODY MASS INDEX DOCD: CPT | Performed by: PHYSICIAN ASSISTANT

## 2022-06-14 RX ORDER — LISINOPRIL 10 MG/1
10 TABLET ORAL DAILY
Qty: 90 TABLET | Refills: 1 | Status: SHIPPED | OUTPATIENT
Start: 2022-06-14

## 2022-06-14 RX ORDER — OMEPRAZOLE 20 MG/1
20 CAPSULE, DELAYED RELEASE ORAL
Qty: 90 CAPSULE | Refills: 1 | Status: SHIPPED | OUTPATIENT
Start: 2022-06-14

## 2022-06-14 NOTE — PATIENT INSTRUCTIONS
Problem List Items Addressed This Visit          Digestive    GERD (gastroesophageal reflux disease)     Stable on as needed PPI  Refills given  Cardiovascular and Mediastinum    Hypertension     Very well controlled refills of Zestril given  Patient is due for CMP  Order placed patient will wait till she gets are no insurance call with results  Other    Hyperlipidemia     Patient due for fasting lipid panel  Order placed will have her get done when she gets her new insurance  Call with results  Vitamin D deficiency     Patient is due for vitamin-D level however we would like to wait until she gets her new insurance and then just call with results                   Other Visit Diagnoses       Encounter for screening mammogram for malignant neoplasm of breast    -  Primary    Essential hypertension        Menopause

## 2022-06-14 NOTE — ASSESSMENT & PLAN NOTE
Very well controlled refills of Zestril given  Patient is due for CMP  Order placed patient will wait till she gets are no insurance call with results

## 2022-06-14 NOTE — PROGRESS NOTES
Assessment and Plan:    Problem List Items Addressed This Visit        Digestive    GERD (gastroesophageal reflux disease)     Stable on as needed PPI  Refills given  Relevant Medications    omeprazole (PriLOSEC) 20 mg delayed release capsule    Colon polyps     PT needs colonoscopy so will order her to do after Sept 1st  She saw Claudette Farmer for this and would like to go back to her  Relevant Orders    Ambulatory Referral to General Surgery       Cardiovascular and Mediastinum    Hypertension     Very well controlled refills of Zestril given  Patient is due for CMP  Order placed patient will wait till she gets are no insurance call with results  Relevant Medications    lisinopril (ZESTRIL) 10 mg tablet    Other Relevant Orders    Comprehensive metabolic panel       Other    Hyperlipidemia     Patient due for fasting lipid panel  Order placed will have her get done when she gets her new insurance  Call with results  Relevant Orders    Lipid Panel with Direct LDL reflex    Vitamin D deficiency     Patient is due for vitamin-D level however we would like to wait until she gets her new insurance and then just call with results  Relevant Orders    Vitamin D 25 hydroxy    Encounter for monitoring chronic NSAID therapy     Patient no longer taking Mobic             Healthcare maintenance    Relevant Orders    Ambulatory referral to Obstetrics / Gynecology      Other Visit Diagnoses     Encounter for screening mammogram for malignant neoplasm of breast    -  Primary    Relevant Orders    Mammo screening bilateral w 3d & cad    Essential hypertension        Relevant Medications    lisinopril (ZESTRIL) 10 mg tablet    Menopause        Relevant Orders    DXA bone density spine hip and pelvis                 Diagnoses and all orders for this visit:    Encounter for screening mammogram for malignant neoplasm of breast  -     Mammo screening bilateral w 3d & cad; Future    Essential hypertension  -     lisinopril (ZESTRIL) 10 mg tablet; Take 1 tablet (10 mg total) by mouth daily    Gastroesophageal reflux disease, unspecified whether esophagitis present  -     omeprazole (PriLOSEC) 20 mg delayed release capsule; Take 1 capsule (20 mg total) by mouth daily before breakfast    Menopause  -     DXA bone density spine hip and pelvis; Future    Primary hypertension  -     Comprehensive metabolic panel    Vitamin D deficiency  -     Vitamin D 25 hydroxy    Hyperlipidemia, unspecified hyperlipidemia type  -     Lipid Panel with Direct LDL reflex    Polyp of colon, unspecified part of colon, unspecified type  -     Ambulatory Referral to General Surgery; Future    Healthcare maintenance  -     Ambulatory referral to Obstetrics / Gynecology; Future    Encounter for monitoring chronic NSAID therapy            Subjective:      Patient ID: Odin Mccormick is a 61 y o  female  CC:    Chief Complaint   Patient presents with    Follow-up     Patient present today for her Annual follow up  HPI:      Odin Mccormick is here for chronic conditions f/u including the diagnosis of Essential hypertension  Gastroesophageal reflux disease, unspecified whether esophagitis present  Encounter for screening mammogram for malignant neoplasm of breast  (primary encounter diagnosis)  Menopause   Pt  states they are taking all medications as directed without complaints or side effects  The following portions of the patient's history were reviewed and updated as appropriate: allergies, current medications, past family history, past medical history, past social history, past surgical history and problem list       Review of Systems   Constitutional: Negative  HENT: Negative  Eyes: Negative  Respiratory: Negative  Cardiovascular: Negative  Gastrointestinal: Negative  Endocrine: Negative  Genitourinary: Negative  Musculoskeletal: Negative  Skin: Negative  Allergic/Immunologic: Negative  Neurological: Negative  Hematological: Negative  Psychiatric/Behavioral: Negative  Data to review:       Objective:    Vitals:    06/14/22 1330   BP: 108/64   BP Location: Right arm   Patient Position: Sitting   Cuff Size: Large   Pulse: 101   SpO2: 98%   Weight: 73 5 kg (162 lb)   Height: 5' 2 5" (1 588 m)        Physical Exam  Vitals and nursing note reviewed  Constitutional:       Appearance: Normal appearance  She is well-developed  HENT:      Head: Normocephalic and atraumatic  Eyes:      General: Lids are normal       Conjunctiva/sclera: Conjunctivae normal       Pupils: Pupils are equal, round, and reactive to light  Cardiovascular:      Rate and Rhythm: Normal rate and regular rhythm  Heart sounds: No murmur heard  Pulmonary:      Effort: Pulmonary effort is normal       Breath sounds: Normal breath sounds  Skin:     General: Skin is warm and dry  Neurological:      General: No focal deficit present  Mental Status: She is alert  Coordination: Coordination is intact  Psychiatric:         Mood and Affect: Mood normal          Behavior: Behavior normal  Behavior is cooperative  Thought Content: Thought content normal          Judgment: Judgment normal            BMI Counseling: Body mass index is 29 16 kg/m²  The BMI is above normal  Nutrition recommendations include decreasing portion sizes, encouraging healthy choices of fruits and vegetables, decreasing fast food intake, consuming healthier snacks, limiting drinks that contain sugar, moderation in carbohydrate intake, increasing intake of lean protein, reducing intake of saturated and trans fat and reducing intake of cholesterol  Exercise recommendations include exercising 3-5 times per week  No pharmacotherapy was ordered  Rationale for BMI follow-up plan is due to patient being overweight or obese       Depression Screening and Follow-up Plan: Patient was screened for depression during today's encounter  They screened negative with a PHQ-2 score of 0

## 2022-06-14 NOTE — ASSESSMENT & PLAN NOTE
Patient is due for vitamin-D level however we would like to wait until she gets her new insurance and then just call with results

## 2022-06-14 NOTE — ASSESSMENT & PLAN NOTE
PT needs colonoscopy so will order her to do after Sept 1st  She saw Earlis Blades for this and would like to go back to her

## 2022-06-14 NOTE — ASSESSMENT & PLAN NOTE
Patient due for fasting lipid panel  Order placed will have her get done when she gets her new insurance  Call with results

## 2022-10-07 ENCOUNTER — HOSPITAL ENCOUNTER (OUTPATIENT)
Dept: MAMMOGRAPHY | Facility: MEDICAL CENTER | Age: 67
Discharge: HOME | End: 2022-10-07
Payer: COMMERCIAL

## 2022-10-07 ENCOUNTER — HOSPITAL ENCOUNTER (OUTPATIENT)
Dept: BONE DENSITY | Facility: MEDICAL CENTER | Age: 67
Discharge: HOME | End: 2022-10-07
Payer: COMMERCIAL

## 2022-10-07 VITALS — WEIGHT: 162.04 LBS | HEIGHT: 63 IN | BODY MASS INDEX: 28.71 KG/M2

## 2022-10-07 DIAGNOSIS — Z12.31 ENCOUNTER FOR SCREENING MAMMOGRAM FOR MALIGNANT NEOPLASM OF BREAST: ICD-10-CM

## 2022-10-07 DIAGNOSIS — Z78.0 MENOPAUSE: ICD-10-CM

## 2022-10-07 PROCEDURE — 77067 SCR MAMMO BI INCL CAD: CPT

## 2022-10-07 PROCEDURE — 77063 BREAST TOMOSYNTHESIS BI: CPT

## 2022-10-07 PROCEDURE — 77080 DXA BONE DENSITY AXIAL: CPT

## 2022-10-12 ENCOUNTER — APPOINTMENT (OUTPATIENT)
Dept: LAB | Facility: CLINIC | Age: 67
End: 2022-10-12
Payer: COMMERCIAL

## 2022-10-12 LAB
25(OH)D3 SERPL-MCNC: 95.9 NG/ML (ref 30–100)
ALBUMIN SERPL BCP-MCNC: 3.6 G/DL (ref 3.5–5)
ALP SERPL-CCNC: 89 U/L (ref 46–116)
ALT SERPL W P-5'-P-CCNC: 21 U/L (ref 12–78)
ANION GAP SERPL CALCULATED.3IONS-SCNC: 7 MMOL/L (ref 4–13)
AST SERPL W P-5'-P-CCNC: 8 U/L (ref 5–45)
BILIRUB SERPL-MCNC: 0.4 MG/DL (ref 0.2–1)
BUN SERPL-MCNC: 21 MG/DL (ref 5–25)
CALCIUM SERPL-MCNC: 9.2 MG/DL (ref 8.3–10.1)
CHLORIDE SERPL-SCNC: 108 MMOL/L (ref 96–108)
CHOLEST SERPL-MCNC: 158 MG/DL
CO2 SERPL-SCNC: 23 MMOL/L (ref 21–32)
CREAT SERPL-MCNC: 1.04 MG/DL (ref 0.6–1.3)
GFR SERPL CREATININE-BSD FRML MDRD: 56 ML/MIN/1.73SQ M
GLUCOSE P FAST SERPL-MCNC: 96 MG/DL (ref 65–99)
HDLC SERPL-MCNC: 50 MG/DL
LDLC SERPL CALC-MCNC: 92 MG/DL (ref 0–100)
POTASSIUM SERPL-SCNC: 4.4 MMOL/L (ref 3.5–5.3)
PROT SERPL-MCNC: 7.7 G/DL (ref 6.4–8.4)
SODIUM SERPL-SCNC: 138 MMOL/L (ref 135–147)
TRIGL SERPL-MCNC: 82 MG/DL

## 2022-10-18 ENCOUNTER — OFFICE VISIT (OUTPATIENT)
Dept: FAMILY MEDICINE CLINIC | Facility: CLINIC | Age: 67
End: 2022-10-18
Payer: COMMERCIAL

## 2022-10-18 VITALS
WEIGHT: 162 LBS | HEIGHT: 63 IN | BODY MASS INDEX: 28.7 KG/M2 | TEMPERATURE: 97.2 F | HEART RATE: 90 BPM | SYSTOLIC BLOOD PRESSURE: 102 MMHG | DIASTOLIC BLOOD PRESSURE: 66 MMHG

## 2022-10-18 DIAGNOSIS — K63.5 POLYP OF COLON, UNSPECIFIED PART OF COLON, UNSPECIFIED TYPE: ICD-10-CM

## 2022-10-18 DIAGNOSIS — I10 PRIMARY HYPERTENSION: ICD-10-CM

## 2022-10-18 DIAGNOSIS — K21.9 GASTROESOPHAGEAL REFLUX DISEASE, UNSPECIFIED WHETHER ESOPHAGITIS PRESENT: ICD-10-CM

## 2022-10-18 DIAGNOSIS — E78.5 HYPERLIPIDEMIA, UNSPECIFIED HYPERLIPIDEMIA TYPE: ICD-10-CM

## 2022-10-18 DIAGNOSIS — Z00.00 MEDICARE ANNUAL WELLNESS VISIT, INITIAL: Primary | ICD-10-CM

## 2022-10-18 DIAGNOSIS — F41.9 ANXIETY DISORDER, UNSPECIFIED TYPE: ICD-10-CM

## 2022-10-18 DIAGNOSIS — N18.31 STAGE 3A CHRONIC KIDNEY DISEASE (HCC): ICD-10-CM

## 2022-10-18 DIAGNOSIS — E55.9 VITAMIN D DEFICIENCY: ICD-10-CM

## 2022-10-18 PROCEDURE — G0438 PPPS, INITIAL VISIT: HCPCS | Performed by: PHYSICIAN ASSISTANT

## 2022-10-18 PROCEDURE — 99214 OFFICE O/P EST MOD 30 MIN: CPT | Performed by: PHYSICIAN ASSISTANT

## 2022-10-18 RX ORDER — FLUOCINOLONE ACETONIDE 0.1 MG/ML
SOLUTION TOPICAL
COMMUNITY
Start: 2022-09-22

## 2022-10-18 RX ORDER — KETOCONAZOLE 20 MG/G
CREAM TOPICAL
COMMUNITY
Start: 2022-09-22

## 2022-10-18 NOTE — ASSESSMENT & PLAN NOTE
Blue pack in 5 wishes given patient encouraged to bring her living will to scan into her chart encouraged to get shingles at the pharmacy encouraged to get Prevnar 20 today encouraged to get flu and COVID boosters before winter DEXA scan and mammogram have been done DEXA pending  Blood work up-to-date  Patient has appointment with Dr Vargas Núñez for colonoscopy later this month

## 2022-10-18 NOTE — PROGRESS NOTES
Assessment and Plan:     Problem List Items Addressed This Visit        Digestive    GERD (gastroesophageal reflux disease)     Stable on daily Prilosec  Colon polyps     Patient has an appoint with Dr Spenser Mcbride at the end of this month  Cardiovascular and Mediastinum    Hypertension     Patient with excellent blood pressure control on Zestril 10 mg once daily  Relevant Orders    Comprehensive metabolic panel    CBC and differential       Genitourinary    Stage 3a chronic kidney disease ([facility] [address] )     Lab Results   Component Value Date    EGFR 56 10/12/2022    EGFR 59 06/02/2021    EGFR >60 0 07/20/2017    CREATININE 1 04 10/12/2022    CREATININE 1 02 06/02/2021    CREATININE 0 86 07/20/2017   PT was just sick and may have been dehydrated when labs were done  Avoid NSAIDS and increase fluids  Other    Anxiety disorder     Patient does have a prescription for Ativan 0 5 mg that she takes very infrequently  Hyperlipidemia     Patient's cholesterol numbers are amazing LDL is only 92 HDL is 50 triglycerides only 82 on no medication  Continue check yearly  Relevant Orders    Lipid Panel with Direct LDL reflex    Vitamin D deficiency     Vitamin-D is amazing at 95 but this may be too much  Pt  does not know how much she is taking a day and her DEXA results are pending  PT will call in the dose  Relevant Orders    Vitamin D 25 hydroxy    Medicare annual wellness visit, initial - Primary     Blue pack in 5 wishes given patient encouraged to bring her living will to scan into her chart encouraged to get shingles at the pharmacy encouraged to get Prevnar 20 today encouraged to get flu and COVID boosters before winter DEXA scan and mammogram have been done DEXA pending  Blood work up-to-date  Patient has appointment with Dr Spenser Mcbride for colonoscopy later this month                 Depression Screening and Follow-up Plan: Patient was screened for depression during today's encounter  They screened negative with a PHQ-2 score of 0  Preventive health issues were discussed with patient, and age appropriate screening tests were ordered as noted in patient's After Visit Summary  Personalized health advice and appropriate referrals for health education or preventive services given if needed, as noted in patient's After Visit Summary  History of Present Illness:     Patient presents for a Medicare Wellness Visit      Rosa Maher is here for chronic conditions f/u including the diagnosis of No diagnosis found  Pt  states they are taking all medications as directed without complaints or side effects   Pt  had labs done prior to today's visit which included Recent Results (from the past 672 hour(s))  -Lipid Panel with Direct LDL reflex:   Collection Time: 10/12/22  9:11 AM       Result                      Value             Ref Range           Cholesterol                 158               See Comment *       Triglycerides               82                See Comment *       HDL, Direct                 50                >=50 mg/dL          LDL Calculated              92                0 - 100 mg/dL  -Comprehensive metabolic panel:   Collection Time: 10/12/22  9:11 AM       Result                      Value             Ref Range           Sodium                      138               135 - 147 mm*       Potassium                   4 4               3 5 - 5 3 mm*       Chloride                    108               96 - 108 mmo*       CO2                         23                21 - 32 mmol*       ANION GAP                   7                 4 - 13 mmol/L       BUN                         21                5 - 25 mg/dL        Creatinine                  1 04              0 60 - 1 30 *       Glucose, Fasting            96                65 - 99 mg/dL       Calcium                     9 2               8 3 - 10 1 m*       AST                         8                 5 - 45 U/L ALT                         21                12 - 78 U/L         Alkaline Phosphatase        89                46 - 116 U/L        Total Protein               7 7               6 4 - 8 4 g/*       Albumin                     3 6               3 5 - 5 0 g/*       Total Bilirubin             0 40              0 20 - 1 00 *       eGFR                        56                ml/min/1 73s*  -Vitamin D 25 hydroxy:   Collection Time: 10/12/22  9:11 AM       Result                      Value             Ref Range           Vit D, 25-Hydroxy           95 9              30 0 - 100 0*       Patient Care Team:  Mildred Palomares PA-C as PCP - General (Family Medicine)  MD Beth Rousseau MD     Review of Systems:     Review of Systems   Constitutional: Negative  HENT: Negative  Eyes: Negative  Respiratory: Negative  Cardiovascular: Negative  Gastrointestinal: Negative  Endocrine: Negative  Genitourinary: Negative  Musculoskeletal: Negative  Skin: Negative  Allergic/Immunologic: Negative  Neurological: Negative  Hematological: Negative  Psychiatric/Behavioral: Negative  Problem List:     Patient Active Problem List   Diagnosis   • Anxiety disorder   • Arthralgia of multiple joints   • Arthritis   • GERD (gastroesophageal reflux disease)   • Hyperlipidemia   • Hypertension   • Migraine headache   • Simple goiter   • Vitamin D deficiency   • Dietary calcium deficiency   • Chronic pain of right wrist   • Encounter for monitoring chronic NSAID therapy   • Deficiency of anterior cruciate ligament   • Fracture of tibial plateau   • 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   • Knee joint effusion   • Colon polyps   • History of COVID-19   • Medicare annual wellness visit, initial   • Stage 3a chronic kidney disease ([address] )      Past Medical and Surgical History:     Past Medical History:   Diagnosis Date   • Diverticulosis     of colon   Last assessed: 12   • Hypertension      Past Surgical History:   Procedure Laterality Date   • BREAST BIOPSY Left 2005    Percutaneous Needle Core  Dr Boo Gibbs   • CARPECTOMY PROXIMAL ROW Right    • DILATION AND CURETTAGE OF UTERUS  2007    MMR  Polyp benign  Dr Burton [place]   • INDUCED       Surgically induced  By dilation and curettage  24 yo   • OTHER SURGICAL HISTORY  2005    Anterior Colporrhaphy (for pelvic relaxation)  Dr Jdaa Perez   • TUBAL LIGATION Bilateral     23 yo   • URETHRAL SLING  2005    Mid  TOT  Dr Jada Perez (c ant colporr, Prolift, extraperitoneal colpopexy   • UTERINE SUSPENSION  2005    Prolift, mesh  Dr Jada Perez      Family History:     Family History   Problem Relation Age of Onset   • Hypertension Mother    • Dementia Father    • Hypertension Father    • Stroke Father    • Alcohol abuse Brother    • Drug abuse Brother    • Breast cancer Maternal Grandmother         MA x2   • Dementia Maternal Grandmother         Several a, u's   • Deep vein thrombosis Maternal Grandmother         Pregnancy related   • Osteoporosis Paternal Grandmother    • Emphysema Paternal Grandfather    • Heart disease Paternal Grandfather         Ischemic   • Migraines Daughter    • Migraines Son    • Breast cancer Maternal Aunt         MA x2   • Dementia Paternal [de-identified]         Several a, u's   • Dementia Paternal Uncle         Several a, u's   • Colon cancer Cousin    • Diabetes type I Other         Mellitus   • Lung disease Family       Social History:     Social History     Socioeconomic History   • Marital status: /Civil Union     Spouse name: None   • Number of children: None   • Years of education: None   • Highest education level: None   Occupational History   • Occupation: Customer Service     Comment: [employer]   Tobacco Use   • Smoking status: Former Smoker   • Smokeless tobacco: Never Used   • Tobacco comment: Secondhand smoke exposure   smoked in house until     46 yrs 2017   Substance and Sexual Activity   • Alcohol use: Yes     Comment: Social/3x month   • Drug use: No   • Sexual activity: None   Other Topics Concern   • None   Social History Narrative    Uses safety equipment: seatbelts     Social Determinants of Health     Financial Resource Strain: Low Risk    • Difficulty of Paying Living Expenses: Not hard at all   Food Insecurity: Not on file   Transportation Needs: No Transportation Needs   • Lack of Transportation (Medical): No   • Lack of Transportation (Non-Medical): No   Physical Activity: Not on file   Stress: Not on file   Social Connections: Not on file   Intimate Partner Violence: Not on file   Housing Stability: Not on file      Medications and Allergies:     Current Outpatient Medications   Medication Sig Dispense Refill   • acetaminophen (TYLENOL) 500 mg tablet Take 500 mg by mouth every 6 (six) hours as needed     • cholecalciferol (VITAMIN D3) 1,000 units tablet Take 1 tablet (1,000 Units total) by mouth daily 30 tablet 0   • diphenhydrAMINE-acetaminophen (TYLENOL PM)  MG TABS Take by mouth     • fluocinolone (SYNALAR) 0 01 % external solution APPLY TO AFFECTED AREA OF SCALP NIGHTLY FOR 2 WEEKS AND THEN DECREASE TO 2-3 TIMES A WEEK FOR MAINTENANCE  AVOID FACE     • hydrocortisone 2 5 % cream MIX PEA SZIED DOSE WITH KETOCONAZOLE CREAM AND APPLY TO AFFECTED AREA TWICE A DAY FOR 2 WEEKS THEN 1-2 TIMES PER WEEK AS NEEDED     • ketoconazole (NIZORAL) 2 % cream APPLY TOPICALLY TO AFFECTED AREA ON FACE, EARS TWICE A DAY AS DIRECTED     • lisinopril (ZESTRIL) 10 mg tablet Take 1 tablet (10 mg total) by mouth daily 90 tablet 1   • LORazepam (ATIVAN) 0 5 mg tablet Take 1 tablet (0 5 mg total) by mouth as needed for anxiety 30 tablet 0   • omeprazole (PriLOSEC) 20 mg delayed release capsule Take 1 capsule (20 mg total) by mouth daily before breakfast 90 capsule 1     No current facility-administered medications for this visit       No Known Allergies Immunizations:     Immunization History   Administered Date(s) Administered   • COVID-19 MODERNA VACC 0 5 ML IM 04/05/2021, 05/06/2021      Health Maintenance:         Topic Date Due   • Cervical Cancer Screening  05/01/2020   • Colorectal Cancer Screening  12/14/2020   • Breast Cancer Screening: Mammogram  10/07/2024   • HIV Screening  Completed   • Hepatitis C Screening  Completed         Topic Date Due   • COVID-19 Vaccine (3 - Booster for Moderna series) 10/06/2021   • Influenza Vaccine (1) Never done   • Pneumococcal Vaccine: 65+ Years (1 - PCV) Never done      Medicare Screening Tests and Risk Assessments:     Radha Harman is here for her Welcome to Medicare visit  Health Risk Assessment:   Patient rates overall health as good  Patient feels that their physical health rating is same  Patient is satisfied with their life  Eyesight was rated as same  Hearing was rated as same  Patient feels that their emotional and mental health rating is same  Patients states they are never, rarely angry  Patient states they are never, rarely unusually tired/fatigued  Pain experienced in the last 7 days has been none  Patient states that she has experienced no weight loss or gain in last 6 months  Depression Screening:   PHQ-2 Score: 0      Fall Risk Screening: In the past year, patient has experienced: no history of falling in past year      Urinary Incontinence Screening:   Patient has not leaked urine accidently in the last six months  Home Safety:  Patient does not have trouble with stairs inside or outside of their home  Patient has working smoke alarms and has no working carbon monoxide detector  Home safety hazards include: none  Nutrition:   Current diet is Regular and Limited junk food  Medications:   Patient is currently taking over-the-counter supplements  OTC medications include: see medication list  Patient is able to manage medications       Activities of Daily Living (ADLs)/Instrumental Activities of Daily Living (IADLs):   Walk and transfer into and out of bed and chair?: Yes  Dress and groom yourself?: Yes    Bathe or shower yourself?: Yes    Feed yourself? Yes  Do your laundry/housekeeping?: Yes  Manage your money, pay your bills and track your expenses?: Yes  Make your own meals?: Yes    Do your own shopping?: Yes    Previous Hospitalizations:   Any hospitalizations or ED visits within the last 12 months?: No      Advance Care Planning:     Advanced directive counseling given: Yes    Five wishes given: Yes      Cognitive Screening:   Provider or family/friend/caregiver concerned regarding cognition?: No    PREVENTIVE SCREENINGS      Cardiovascular Screening:    General: Screening Not Indicated, History Lipid Disorder and Screening Current      Diabetes Screening:     General: Screening Current      Colorectal Cancer Screening:       Due for: Colonoscopy - High Risk      Breast Cancer Screening:     General: Screening Current      Cervical Cancer Screening:    General: Screening Not Indicated      Osteoporosis Screening:    General: Screening Current      Abdominal Aortic Aneurysm (AAA) Screening:        General: Screening Not Indicated      Lung Cancer Screening:     General: Screening Not Indicated      Hepatitis C Screening:    General: Screening Current    Screening, Brief Intervention, and Referral to Treatment (SBIRT)    Screening  Typical number of drinks in a day: 0  Typical number of drinks in a week: 0  Interpretation: Low risk drinking behavior  AUDIT-C Screenin) How often did you have a drink containing alcohol in the past year? monthly or less  2) How many drinks did you have on a typical day when you were drinking in the past year?  1 to 2  3) How often did you have 6 or more drinks on one occasion in the past year? never    AUDIT-C Score: 1  Interpretation: Score 0-2 (female): Negative screen for alcohol misuse    Single Item Drug Screening:  How often have you used an illegal drug (including marijuana) or a prescription medication for non-medical reasons in the past year? never    Single Item Drug Screen Score: 0  Interpretation: Negative screen for possible drug use disorder     Visual Acuity Screening    Right eye Left eye Both eyes   Without correction:      With correction: 20/25 20/20 20/20        Physical Exam:     /66 (BP Location: Left arm, Patient Position: Sitting, Cuff Size: Adult)   Pulse 90   Temp (!) 97 2 °F (36 2 °C) (Probe)   Ht 5' 2 5" (1 588 m)   Wt 73 5 kg (162 lb)   BMI 29 16 kg/m²     Physical Exam  Vitals and nursing note reviewed  Constitutional:       General: She is not in acute distress  Appearance: She is well-developed  She is not diaphoretic  HENT:      Head: Normocephalic and atraumatic  Right Ear: External ear normal       Left Ear: External ear normal       Nose: Nose normal       Mouth/Throat:      Pharynx: No oropharyngeal exudate  Eyes:      General: No scleral icterus  Right eye: No discharge  Left eye: No discharge  Conjunctiva/sclera: Conjunctivae normal       Pupils: Pupils are equal, round, and reactive to light  Neck:      Thyroid: No thyromegaly  Vascular: No JVD  Trachea: No tracheal deviation  Cardiovascular:      Rate and Rhythm: Normal rate and regular rhythm  Heart sounds: Normal heart sounds  No murmur heard  No friction rub  No gallop  Pulmonary:      Effort: Pulmonary effort is normal  No respiratory distress  Breath sounds: Normal breath sounds  No stridor  No wheezing or rales  Chest:      Chest wall: No tenderness  Abdominal:      General: Bowel sounds are normal  There is no distension  Palpations: Abdomen is soft  There is no mass  Tenderness: There is no abdominal tenderness  There is no guarding or rebound  Hernia: No hernia is present  Musculoskeletal:         General: No deformity  Normal range of motion        Cervical back: Normal range of motion and neck supple  Lymphadenopathy:      Cervical: No cervical adenopathy  Skin:     General: Skin is warm and dry  Capillary Refill: Capillary refill takes more than 3 seconds  Findings: No rash  Neurological:      Mental Status: She is alert and oriented to person, place, and time  Motor: No abnormal muscle tone  Coordination: Coordination normal       Deep Tendon Reflexes: Reflexes normal    Psychiatric:         Behavior: Behavior normal          Thought Content:  Thought content normal          Judgment: Judgment normal           Kip Babb PA-C

## 2022-10-18 NOTE — ASSESSMENT & PLAN NOTE
Patient's cholesterol numbers are amazing LDL is only 92 HDL is 50 triglycerides only 82 on no medication  Continue check yearly

## 2022-10-18 NOTE — PATIENT INSTRUCTIONS
1  Medicare annual wellness visit, initial  Assessment & Plan:  Blue pack in 5 wishes given patient encouraged to bring her living will to scan into her chart encouraged to get shingles at the pharmacy encouraged to get Prevnar 20 today encouraged to get flu and COVID boosters before winter DEXA scan and mammogram have been done DEXA pending  Blood work up-to-date  Patient has appointment with Dr Vargas Núñez for colonoscopy later this month  2  Primary hypertension  Assessment & Plan:  Patient with excellent blood pressure control on Zestril 10 mg once daily  Orders:  -     Comprehensive metabolic panel; Future; Expected date: 10/18/2023  -     CBC and differential; Future; Expected date: 10/18/2023    3  Vitamin D deficiency  Assessment & Plan:  Vitamin-D is amazing at 80 but this may be too much  Pt  does not know how much she is taking a day and her DEXA results are pending  PT will call in the dose  Orders:  -     Vitamin D 25 hydroxy; Future; Expected date: 10/18/2023    4  Hyperlipidemia, unspecified hyperlipidemia type  Assessment & Plan:  Patient's cholesterol numbers are amazing LDL is only 92 HDL is 50 triglycerides only 82 on no medication  Continue check yearly  Orders:  -     Lipid Panel with Direct LDL reflex; Future; Expected date: 10/18/2023    5  Anxiety disorder, unspecified type  Assessment & Plan:  Patient does have a prescription for Ativan 0 5 mg that she takes very infrequently  6  Gastroesophageal reflux disease, unspecified whether esophagitis present  Assessment & Plan:  Stable on daily Prilosec  7  Polyp of colon, unspecified part of colon, unspecified type  Assessment & Plan:  Patient has an appoint with Dr Vargas Núñez at the end of this month        8  Stage 3a chronic kidney disease [facility])  Assessment & Plan:  Lab Results   Component Value Date    EGFR 56 10/12/2022    EGFR 59 06/02/2021    EGFR >60 0 07/20/2017    CREATININE 1 04 10/12/2022    CREATININE 1 02 06/02/2021    CREATININE 0 86 07/20/2017   PT was just sick and may have been dehydrated when labs were done  Avoid NSAIDS and increase fluids

## 2022-10-18 NOTE — ASSESSMENT & PLAN NOTE
Lab Results   Component Value Date    EGFR 56 10/12/2022    EGFR 59 06/02/2021    EGFR >60 0 07/20/2017    CREATININE 1 04 10/12/2022    CREATININE 1 02 06/02/2021    CREATININE 0 86 07/20/2017   PT was just sick and may have been dehydrated when labs were done  Avoid NSAIDS and increase fluids

## 2022-10-18 NOTE — ASSESSMENT & PLAN NOTE
Vitamin-D is amazing at 95 but this may be too much  Pt  does not know how much she is taking a day and her DEXA results are pending  PT will call in the dose

## 2022-10-20 ENCOUNTER — OFFICE VISIT (OUTPATIENT)
Dept: OBGYN CLINIC | Facility: CLINIC | Age: 67
End: 2022-10-20
Payer: COMMERCIAL

## 2022-10-20 VITALS
HEIGHT: 63 IN | WEIGHT: 164.8 LBS | DIASTOLIC BLOOD PRESSURE: 62 MMHG | SYSTOLIC BLOOD PRESSURE: 114 MMHG | BODY MASS INDEX: 29.2 KG/M2

## 2022-10-20 DIAGNOSIS — Z12.31 VISIT FOR SCREENING MAMMOGRAM: ICD-10-CM

## 2022-10-20 DIAGNOSIS — Z00.00 HEALTHCARE MAINTENANCE: ICD-10-CM

## 2022-10-20 DIAGNOSIS — Z01.419 ENCOUNTER FOR ANNUAL ROUTINE GYNECOLOGICAL EXAMINATION: Primary | ICD-10-CM

## 2022-10-20 DIAGNOSIS — Z12.4 PAP SMEAR FOR CERVICAL CANCER SCREENING: ICD-10-CM

## 2022-10-20 DIAGNOSIS — E58 DIETARY CALCIUM DEFICIENCY: ICD-10-CM

## 2022-10-20 DIAGNOSIS — Z72.3 INADEQUATE EXERCISE: ICD-10-CM

## 2022-10-20 PROBLEM — S82.143A FRACTURE OF TIBIAL PLATEAU: Status: RESOLVED | Noted: 2019-11-18 | Resolved: 2022-10-20

## 2022-10-20 PROBLEM — M93.1 KIENBOCK'S DISEASE OF LUNATE BONE OF RIGHT WRIST IN ADULT: Status: RESOLVED | Noted: 2019-07-31 | Resolved: 2022-10-20

## 2022-10-20 PROBLEM — M25.469 KNEE JOINT EFFUSION: Status: RESOLVED | Noted: 2019-11-18 | Resolved: 2022-10-20

## 2022-10-20 PROBLEM — M23.8X9 DEFICIENCY OF ANTERIOR CRUCIATE LIGAMENT: Status: RESOLVED | Noted: 2019-11-18 | Resolved: 2022-10-20

## 2022-10-20 PROCEDURE — G0476 HPV COMBO ASSAY CA SCREEN: HCPCS | Performed by: OBSTETRICS & GYNECOLOGY

## 2022-10-20 PROCEDURE — G0101 CA SCREEN;PELVIC/BREAST EXAM: HCPCS | Performed by: OBSTETRICS & GYNECOLOGY

## 2022-10-20 PROCEDURE — G0145 SCR C/V CYTO,THINLAYER,RESCR: HCPCS | Performed by: OBSTETRICS & GYNECOLOGY

## 2022-10-20 NOTE — PROGRESS NOTES
NEW PATIENT    Pt is a70 y o  Brenda Chapman ,menopausal, who presents for preventive care  Partner same ; lifetime  <5 partners         safe sexual practices followed: not currently active     does feel safe in relationship:yes    Dairy: 1 cheese 3-5x/week  Vitamin D: takes supplement  Exercise: none  Pap: 2017-wnl HRHPV neg; 10/2022-  Mammogram: 10/7/2022-wnl, repeat or 1 year given  Colonoscopy: 2020-polyp, diverticulosis, internal hemorrhoids--has follow up scheduled  DEXA: 10/7/2022-results pending  safety at home:  yes  tobacco use no    Past Medical History:   Diagnosis Date   • Deficiency of anterior cruciate ligament 2019   • Diverticulosis     of colon  Last assessed: 12   • Fracture of tibial plateau    • Hypertension    • 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   • Knee joint effusion 2019   • Migraine during menstruating years   • Pap smear for cervical cancer screening     -wnl; 10/2022   • Varicella 1962       Past Surgical History:   Procedure Laterality Date   • BREAST BIOPSY Left 2005    Percutaneous Needle Core  Dr Brandon Stratton   • CARPECTOMY PROXIMAL ROW Right    • DILATION AND CURETTAGE OF UTERUS  2007    MMR  Polyp benign  Dr Mariel Mcgarry, hysteroscopy   • INDUCED       Surgically induced  By dilation and curettage  24 yo   • OTHER SURGICAL HISTORY  2005    Anterior Colporrhaphy (for pelvic relaxation)  Dr India Choudhury   • TUBAL LIGATION Bilateral     25 yo   • URETHRAL SLING  2005    Mid  TOT  Dr nIdia Choudhury (c ant colporr, Prolift, extraperitoneal colpopexy   • UTERINE SUSPENSION  2005    Prolift, mesh   Dr Odell Vera Hx:   OB History    Para Term  AB Living   2 2 2     2   SAB IAB Ectopic Multiple Live Births                  # Outcome Date GA Lbr Castro/2nd Weight Sex Delivery Anes PTL Lv   2 Term            1 Term               Obstetric Comments   Menarche: 17      Menopause: 49           Current Outpatient Medications:   •  acetaminophen (TYLENOL) 500 mg tablet, Take 500 mg by mouth every 6 (six) hours as needed, Disp: , Rfl:   •  cholecalciferol (VITAMIN D3) 1,000 units tablet, Take 1 tablet (1,000 Units total) by mouth daily, Disp: 30 tablet, Rfl: 0  •  diphenhydrAMINE-acetaminophen (TYLENOL PM)  MG TABS, Take by mouth, Disp: , Rfl:   •  fluocinolone (SYNALAR) 0 01 % external solution, APPLY TO AFFECTED AREA OF SCALP NIGHTLY FOR 2 WEEKS AND THEN DECREASE TO 2-3 TIMES A WEEK FOR MAINTENANCE  AVOID FACE, Disp: , Rfl:   •  hydrocortisone 2 5 % cream, MIX PEA SZIED DOSE WITH KETOCONAZOLE CREAM AND APPLY TO AFFECTED AREA TWICE A DAY FOR 2 WEEKS THEN 1-2 TIMES PER WEEK AS NEEDED, Disp: , Rfl:   •  ketoconazole (NIZORAL) 2 % cream, APPLY TOPICALLY TO AFFECTED AREA ON FACE, EARS TWICE A DAY AS DIRECTED, Disp: , Rfl:   •  lisinopril (ZESTRIL) 10 mg tablet, Take 1 tablet (10 mg total) by mouth daily, Disp: 90 tablet, Rfl: 1  •  LORazepam (ATIVAN) 0 5 mg tablet, Take 1 tablet (0 5 mg total) by mouth as needed for anxiety, Disp: 30 tablet, Rfl: 0  •  omeprazole (PriLOSEC) 20 mg delayed release capsule, Take 1 capsule (20 mg total) by mouth daily before breakfast, Disp: 90 capsule, Rfl: 1    No Known Allergies    Social History     Socioeconomic History   • Marital status: /Civil Union     Spouse name: Vibha Portillo   • Number of children: 2   • Years of education: None   • Highest education level: High school graduate   Occupational History   • Occupation: Customer Service--Retired     Comment: [employer]   Tobacco Use   • Smoking status: Former Smoker     Packs/day: 0 25     Years: 5 00     Pack years: 1 25     Types: Cigarettes     Start date: 1972     Quit date: 1977     Years since quittin 8   • Smokeless tobacco: Never Used   • Tobacco comment: Secondhand smoke exposure   smoked in house until     41 yrs 2017   Vaping Use   • Vaping Use: Never used   Substance and Sexual Activity   • Alcohol use: Yes     Comment: seldom, social drinks if any   • Drug use: No   • Sexual activity: Not Currently     Partners: Male     Birth control/protection: Female Sterilization     Comment: lifetime partners: 3;  1978   Other Topics Concern   • None   Social History Narrative    Mosque: [employer] blood products            Exercise: none    Calcium: 1 cheese 3-5x/week     Social Determinants of Health     Financial Resource Strain: Low Risk    • Difficulty of Paying Living Expenses: Not hard at all   Food Insecurity: Not on file   Transportation Needs: No Transportation Needs   • Lack of Transportation (Medical): No   • Lack of Transportation (Non-Medical):  No   Physical Activity: Not on file   Stress: Not on file   Social Connections: Not on file   Intimate Partner Violence: Not on file   Housing Stability: Not on file       Family History   Problem Relation Age of Onset   • Hypertension Mother [de-identified]   • Dementia Father         due tp a series of strokes   • Hypertension Father    • Stroke Father    • Hydrocephalus Father         had shunt   • No Known Problems Sister    • No Known Problems Sister    • Alcohol abuse Brother    • Drug abuse Brother    • Hypertension Brother    • Breast cancer Maternal Grandmother         MA x2   • Dementia Maternal Grandmother         Several a, u's   • Deep vein thrombosis Maternal Grandmother         Pregnancy related   • COPD Maternal Grandfather    • Osteoporosis Paternal Grandmother    • Emphysema Paternal Grandfather    • Heart disease Paternal Grandfather         Ischemic   • Migraines Daughter    • Migraines Son    • Breast cancer Maternal Aunt         MA x2   • Dementia Paternal Aunt         Several a, u's   • Dementia Paternal Uncle         Several a, u's   • Colon cancer Cousin    • Diabetes type I Other         Mellitus   • Lung disease Family    • Ovarian cancer Neg Hx        Blood pressure 114/62, height 5' 2 5" (1 588 m), weight 74 8 kg (164 lb 12 8 oz), not currently breastfeeding  and Body mass index is 29 66 kg/m²  Physical Exam  Constitutional:       General: She is not in acute distress  Appearance: Normal appearance  She is well-developed  She is obese  She is not ill-appearing  HENT:      Head: Normocephalic and atraumatic  Eyes:      Extraocular Movements: Extraocular movements intact  Conjunctiva/sclera: Conjunctivae normal    Neck:      Thyroid: No thyromegaly  Trachea: No tracheal deviation  Cardiovascular:      Rate and Rhythm: Normal rate and regular rhythm  Heart sounds: Normal heart sounds  Pulmonary:      Effort: Pulmonary effort is normal  No respiratory distress  Breath sounds: Normal breath sounds  No stridor  No wheezing or rales  Abdominal:      General: Bowel sounds are normal  There is no distension  Palpations: Abdomen is soft  There is no mass  Tenderness: There is no abdominal tenderness  There is no guarding or rebound  Hernia: No hernia is present  Musculoskeletal:         General: No tenderness  Normal range of motion  Cervical back: Normal range of motion and neck supple  Lymphadenopathy:      Cervical: No cervical adenopathy  Skin:     General: Skin is warm  Findings: No erythema or rash  Neurological:      Mental Status: She is alert and oriented to person, place, and time  Psychiatric:         Mood and Affect: Mood normal          Behavior: Behavior normal          Thought Content: Thought content normal          Judgment: Judgment normal           Breasts: breasts appear normal, no suspicious masses, no skin or nipple changes or axillary nodes, symmetric fibrous changes in both upper outer quadrants        vulva: normal external genitalia for age and no lesions, masses, epithelial changes, or exudate  vagina: color pale and rugae  flattening of rugae  cervix: parous, no lesions  and pap obtained  uterus: NSSC, AF, NT, mobile  adnexa: no masses or tenderness  rectum: no masses or nodularity    A/P:  Pt is a 74 y o  Diamond Rank with       Renuka Elena was seen today for gynecologic exam     Diagnoses and all orders for this visit:    Encounter for annual routine gynecological examination  -Pt overdue for colonoscopy-has follow up scheduled next week  -Dexa scan performed, but results pending    Visit for screening mammogram  -     Mammo screening bilateral w 3d & cad; Future    Pap smear for cervical cancer screening  -     Liquid-based pap, screening  -pt advised if this pap is normal, she will complete screening paps    Dietary calcium deficiency  Patient advised recommendation of daily dietary calcium of 1200 mg calcium  Inadequate exercise  Patient advised recommendation of exercise 5 times per week for 30 minutes  BMI 29 0-29 9,adult  Patient advised recommendation of BMI to be between 19-25

## 2022-10-24 DIAGNOSIS — M81.0 AGE-RELATED OSTEOPOROSIS WITHOUT CURRENT PATHOLOGICAL FRACTURE: Primary | ICD-10-CM

## 2022-10-25 LAB
HPV HR 12 DNA CVX QL NAA+PROBE: NEGATIVE
HPV16 DNA CVX QL NAA+PROBE: NEGATIVE
HPV18 DNA CVX QL NAA+PROBE: NEGATIVE

## 2022-10-28 ENCOUNTER — OFFICE VISIT (OUTPATIENT)
Dept: SURGERY | Facility: CLINIC | Age: 67
End: 2022-10-28
Payer: COMMERCIAL

## 2022-10-28 VITALS
HEIGHT: 63 IN | WEIGHT: 161.2 LBS | BODY MASS INDEX: 28.56 KG/M2 | HEART RATE: 92 BPM | SYSTOLIC BLOOD PRESSURE: 110 MMHG | DIASTOLIC BLOOD PRESSURE: 80 MMHG | TEMPERATURE: 96.8 F

## 2022-10-28 DIAGNOSIS — K63.5 POLYP OF COLON, UNSPECIFIED PART OF COLON, UNSPECIFIED TYPE: ICD-10-CM

## 2022-10-28 DIAGNOSIS — Z12.11 ENCOUNTER FOR SCREENING COLONOSCOPY: Primary | ICD-10-CM

## 2022-10-28 LAB
LAB AP GYN PRIMARY INTERPRETATION: NORMAL
Lab: NORMAL

## 2022-10-28 PROCEDURE — 99203 OFFICE O/P NEW LOW 30 MIN: CPT | Performed by: SURGERY

## 2022-10-28 NOTE — PROGRESS NOTES
Assessment/Plan:   Sukumar Prakash is a 74 y  o female who is here for a: Screening Colonoscopy  Plan: Colonoscopy for: Screening for Colon Cancer      Previous Colonoscopy and  Location of polyps if any:   10 years ago and  with polyps in the :   unknown     Actually was 12 years ago  Two thousand ten by me  Preoperative Clearance: None    ______________________________________________________    HPI:  Sukumar Prakash is a 74 y  o female who was referred for evaluation of    Screening  Currently:  Symptoms include:  no abdominal pain, change in bowel habits, or black or bloody stools  Family history of colon cancer: None reported       Anticoagulation: none    A1C:     LABS:      Lab Results   Component Value Date    WBC 6 17 07/20/2017    HGB 12 4 07/20/2017    HCT 38 7 07/20/2017    MCV 93 07/20/2017     07/20/2017     Lab Results   Component Value Date     03/10/2015    K 4 4 10/12/2022     10/12/2022    CO2 23 10/12/2022    ANIONGAP 8 03/10/2015    BUN 21 10/12/2022    CREATININE 1 04 10/12/2022    GLUCOSE 103 03/10/2015    GLUF 96 10/12/2022    CALCIUM 9 2 10/12/2022    AST 8 10/12/2022    ALT 21 10/12/2022    ALKPHOS 89 10/12/2022    PROT 7 8 03/10/2015    BILITOT 0 34 03/10/2015    EGFR 56 10/12/2022     No results found for: HGBA1C  No results found for: INR, PROTIME      No orders to display           ROS:  General ROS: negative for - chills, fatigue, fever or night sweats, weight loss  Respiratory ROS: no cough, shortness of breath, or wheezing  Cardiovascular ROS: no chest pain or dyspnea on exertion  Genito-Urinary ROS: no dysuria, trouble voiding, or hematuria  Musculoskeletal ROS: negative for - gait disturbance, joint pain or muscle pain  Neurological ROS: no TIA or stroke symptoms  GI ROS: see HPI  Skin ROS: no new rashes or lesions   Lymphatic ROS: no new adenopathy noted by pt     GYN ROS: see HPI, no new GYN history or bleeding noted  Psy ROS: no new mental or behavioral disturbances           Imaging: No new pertinent imaging studies  Patient Active Problem List   Diagnosis   • Anxiety disorder   • Arthralgia of multiple joints   • Arthritis   • GERD (gastroesophageal reflux disease)   • Hyperlipidemia   • Hypertension   • Migraine headache   • Simple goiter   • Vitamin D deficiency   • Dietary calcium deficiency   • Chronic pain of right wrist   • Encounter for monitoring chronic NSAID therapy   • Colon polyps   • History of COVID-19   • Medicare annual wellness visit, initial   • Stage 3a chronic kidney disease ([facility] [address] )   • Encounter for annual routine gynecological examination   • Visit for screening mammogram   • Inadequate exercise   • BMI 29 0-29 9,adult   • Age-related osteoporosis without current pathological fracture         Allergies:  Patient has no known allergies  Current Outpatient Medications:   •  acetaminophen (TYLENOL) 500 mg tablet, Take 500 mg by mouth every 6 (six) hours as needed, Disp: , Rfl:   •  cholecalciferol (VITAMIN D3) 1,000 units tablet, Take 1 tablet (1,000 Units total) by mouth daily, Disp: 30 tablet, Rfl: 0  •  diphenhydrAMINE-acetaminophen (TYLENOL PM)  MG TABS, Take by mouth, Disp: , Rfl:   •  fluocinolone (SYNALAR) 0 01 % external solution, APPLY TO AFFECTED AREA OF SCALP NIGHTLY FOR 2 WEEKS AND THEN DECREASE TO 2-3 TIMES A WEEK FOR MAINTENANCE   AVOID FACE, Disp: , Rfl:   •  hydrocortisone 2 5 % cream, MIX PEA SZIED DOSE WITH KETOCONAZOLE CREAM AND APPLY TO AFFECTED AREA TWICE A DAY FOR 2 WEEKS THEN 1-2 TIMES PER WEEK AS NEEDED, Disp: , Rfl:   •  ketoconazole (NIZORAL) 2 % cream, APPLY TOPICALLY TO AFFECTED AREA ON FACE, EARS TWICE A DAY AS DIRECTED, Disp: , Rfl:   •  lisinopril (ZESTRIL) 10 mg tablet, Take 1 tablet (10 mg total) by mouth daily, Disp: 90 tablet, Rfl: 1  •  LORazepam (ATIVAN) 0 5 mg tablet, Take 1 tablet (0 5 mg total) by mouth as needed for anxiety, Disp: 30 tablet, Rfl: 0  •  omeprazole (PriLOSEC) 20 mg delayed release capsule, Take 1 capsule (20 mg total) by mouth daily before breakfast, Disp: 90 capsule, Rfl: 1    Past Medical History:   Diagnosis Date   • Deficiency of anterior cruciate ligament 2019   • Diverticulosis     of colon  Last assessed: 12   • Fracture of tibial plateau 5786   • Hypertension    • 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   • Knee joint effusion 2019   • Migraine during menstruating years   • Pap smear for cervical cancer screening     -wnl, HRHPV neg; 10/2022   • Varicella        Past Surgical History:   Procedure Laterality Date   • BREAST BIOPSY Left 2005    Percutaneous Needle Core  Dr Nicole Quintana   • CARPECTOMY PROXIMAL ROW Right    • DILATION AND CURETTAGE OF UTERUS  2007    MMR  Polyp benign  Dr Sulaiman Riley, hysteroscopy   • INDUCED       Surgically induced  By dilation and curettage  26 yo   • OTHER SURGICAL HISTORY  2005    Anterior Colporrhaphy (for pelvic relaxation)  Dr Anselmo Pettit   • TUBAL LIGATION Bilateral     23 yo   • URETHRAL SLING  2005    Mid  TOT  Dr Anselmo Pettit (c ant colporr, Prolift, extraperitoneal colpopexy   • UTERINE SUSPENSION  2005    Prolift, mesh   Dr Anselmo Pettit       Family History   Problem Relation Age of Onset   • Hypertension Mother [de-identified]   • Dementia Father         due tp a series of strokes   • Hypertension Father    • Stroke Father    • Hydrocephalus Father         had shunt   • No Known Problems Sister    • No Known Problems Sister    • Alcohol abuse Brother    • Drug abuse Brother    • Hypertension Brother    • Breast cancer Maternal Grandmother         MA x2   • Dementia Maternal Grandmother         Several a, u's   • Deep vein thrombosis Maternal Grandmother         Pregnancy related   • COPD Maternal Grandfather    • Osteoporosis Paternal Grandmother    • Emphysema Paternal Grandfather    • Heart disease Paternal Grandfather         Ischemic   • Migraines Daughter    • Migraines Son    • Breast cancer Maternal Aunt         MA x2   • Dementia Paternal Aunt         Several a, u's   • Dementia Paternal Uncle         Several a, u's   • Colon cancer Cousin    • Diabetes type I Other         Mellitus   • Lung disease Family    • Ovarian cancer Neg Hx        Social History     Socioeconomic History   • Marital status: /Civil Union     Spouse name: Karen Valadez   • Number of children: 2   • Years of education: None   • Highest education level: High school graduate   Occupational History   • Occupation: Customer Service--Retired     Comment: [employer]   Tobacco Use   • Smoking status: Former Smoker     Packs/day: 0 25     Years: 5 00     Pack years: 1 25     Types: Cigarettes     Start date: 1972     Quit date: 1977     Years since quittin 8   • Smokeless tobacco: Never Used   • Tobacco comment: Secondhand smoke exposure   smoked in house until    41 yrs 2017   Vaping Use   • Vaping Use: Never used   Substance and Sexual Activity   • Alcohol use: Yes     Comment: seldom, social drinks if any   • Drug use: No   • Sexual activity: Not Currently     Partners: Male     Birth control/protection: Female Sterilization     Comment: lifetime partners: 3; 1978   Other Topics Concern   • None   Social History Narrative    Hinduism: Ros[employer] blood products            Exercise: none    Calcium: 1 cheese 3-5x/week     Social Determinants of Health     Financial Resource Strain: Low Risk    • Difficulty of Paying Living Expenses: Not hard at all   Food Insecurity: Not on file   Transportation Needs: No Transportation Needs   • Lack of Transportation (Medical): No   • Lack of Transportation (Non-Medical):  No   Physical Activity: Not on file   Stress: Not on file   Social Connections: Not on file   Intimate Partner Violence: Not on file   Housing Stability: Not on file       Exam:   Vitals:    10/28/22 0922   BP: 110/80   Pulse: 92   Temp: (!) 96 8 °F (36 °C)           ______________________________________________________    PHYSICAL EXAM  General Appearance:    Alert, cooperative, no distress,      Head:    Normocephalic without obvious abnormality   Eyes:    PERRL, conjunctiva/corneas clear,        Neck:   Supple, no adenopathy, no JVD   Back:     Symmetric, no spinal or CVA tenderness   Lungs:     Clear to auscultation bilaterally,    Heart:    Regular rate and rhythm, S1 and S2 normal, no murmur   Abdomen:     Non-tender in RUQ, LUQ, RLQ, LLQ, no davey's signs  No visible masses or pulsations  Extremities:   Extremities normal  No clubbing, cyanosis or edema   Psych:   Normal Affect   Neurologic:   CNII-XII intact  Strength symmetric, speech intact                 Page Edwards  Date: 10/28/2022 Time: 9:56 AM       This report has been generated by a voice recognition software system  Therefore, there may be syntax, spelling, and/or grammatical errors  Please call if you've any questions  This  encounter has been billed by a non certified Coder  Informed consent for procedure was personally discussed, reviewed, and signed by Dr An Keys  Discussion by Dr An Keys was carried out regarding risks, benefits, and alternatives with the patient  Risks include but are not limited to:  bleeding, infection, and delayed wound healing or an open wound, pulmonary embolus, leaks from bowel or bile ducts or other viscus, transfusions, death  Discussed in further detail the more common complications and their rates of occurrence   was used if necessary  Patient expressed understanding of the issues discussed and wished/consented to proceed  All questions were answered by Dr An Keys  Discussion performed between patient and the provider signing below       Signature:   Page Edwards  Date: 10/28/2022 Time: 9:56 AM Informed consent for procedure was personally discussed, reviewed, and signed by Dr Yamilka Gilliam  Discussion by Dr Yamilka Gilliam was carried out regarding risks, benefits, and alternatives with the patient  Risks include but are not limited to:  bleeding, infection, and delayed wound healing or an open wound, pulmonary embolus, leaks from bowel or bile ducts or other viscus, transfusions, death  Discussed in further detail the more common complications and their rates of occurrence   was used if necessary  Patient expressed understanding of the issues discussed and wished/consented to proceed  All questions were answered by Dr Yamilka Gilliam  Discussion performed between patient and the provider signing below  Signature:   Kike Davidson MD    Date: 10/28/2022 Time: 9:56 AM           Some portions of this record may have been generated with voice recognition software  There may be translation, syntax,  or grammatical errors  Occasional wrong word or "sound-a-like" substitutions may have occurred due to the inherent limitations of the voice recognition software  Read the chart carefully and recognize, using context, where substitutions may have occurred  If you have any questions, please contact the dictating provider for clarification or correction, as needed  This encounter has been coded by a non-certified coder

## 2022-11-16 ENCOUNTER — TELEPHONE (OUTPATIENT)
Dept: SURGERY | Facility: CLINIC | Age: 67
End: 2022-11-16

## 2022-11-16 NOTE — TELEPHONE ENCOUNTER
Unable to reach patient  Left message to call the office  She needs another appointment before her colonoscopy since it is out of the 30 day window since she was last here

## 2022-12-06 NOTE — PROGRESS NOTES
Assessment/Plan:   Gaurav Carbajal is a 74 y  o female who is here for a: Screening Colonoscopy  Plan: Colonoscopy for: Screening for Colon Cancer      Previous Colonoscopy and  Location of polyps if any:   10 years ago and  with polyps in the :   unknown     Actually was 12 years ago  2010 by Dr Kelin Lipscomb  Preoperative Clearance: None    ______________________________________________________    HPI:  Gaurav Carbajal is a 74 y  o female who was referred for evaluation of    Screening  Currently:  Symptoms include:  no abdominal pain, change in bowel habits, or black or bloody stools  Family history of colon cancer: None reported       Anticoagulation: none    A1C:     LABS:      Lab Results   Component Value Date    WBC 6 17 07/20/2017    HGB 12 4 07/20/2017    HCT 38 7 07/20/2017    MCV 93 07/20/2017     07/20/2017     Lab Results   Component Value Date     03/10/2015    K 4 4 10/12/2022     10/12/2022    CO2 23 10/12/2022    ANIONGAP 8 03/10/2015    BUN 21 10/12/2022    CREATININE 1 04 10/12/2022    GLUCOSE 103 03/10/2015    GLUF 96 10/12/2022    CALCIUM 9 2 10/12/2022    AST 8 10/12/2022    ALT 21 10/12/2022    ALKPHOS 89 10/12/2022    PROT 7 8 03/10/2015    BILITOT 0 34 03/10/2015    EGFR 56 10/12/2022     No results found for: HGBA1C  No results found for: INR, PROTIME      No orders to display           ROS:  General ROS: negative for - chills, fatigue, fever or night sweats, weight loss  Respiratory ROS: no cough, shortness of breath, or wheezing  Cardiovascular ROS: no chest pain or dyspnea on exertion  Genito-Urinary ROS: no dysuria, trouble voiding, or hematuria  Musculoskeletal ROS: negative for - gait disturbance, joint pain or muscle pain  Neurological ROS: no TIA or stroke symptoms  GI ROS: see HPI  Skin ROS: no new rashes or lesions   Lymphatic ROS: no new adenopathy noted by pt     GYN ROS: see HPI, no new GYN history or bleeding noted  Psy ROS: no new mental or behavioral disturbances           Imaging: No new pertinent imaging studies  Patient Active Problem List   Diagnosis   • Anxiety disorder   • Arthralgia of multiple joints   • Arthritis   • GERD (gastroesophageal reflux disease)   • Hyperlipidemia   • Hypertension   • Migraine headache   • Simple goiter   • Vitamin D deficiency   • Dietary calcium deficiency   • Chronic pain of right wrist   • Encounter for monitoring chronic NSAID therapy   • Colon polyps   • History of COVID-19   • Medicare annual wellness visit, initial   • Stage 3a chronic kidney disease ([facility] [address] )   • Encounter for annual routine gynecological examination   • Visit for screening mammogram   • Inadequate exercise   • BMI 29 0-29 9,adult   • Age-related osteoporosis without current pathological fracture         Allergies:  Patient has no known allergies  Current Outpatient Medications:   •  acetaminophen (TYLENOL) 500 mg tablet, Take 500 mg by mouth every 6 (six) hours as needed, Disp: , Rfl:   •  cholecalciferol (VITAMIN D3) 1,000 units tablet, Take 1 tablet (1,000 Units total) by mouth daily, Disp: 30 tablet, Rfl: 0  •  diphenhydrAMINE-acetaminophen (TYLENOL PM)  MG TABS, Take by mouth, Disp: , Rfl:   •  fluocinolone (SYNALAR) 0 01 % external solution, APPLY TO AFFECTED AREA OF SCALP NIGHTLY FOR 2 WEEKS AND THEN DECREASE TO 2-3 TIMES A WEEK FOR MAINTENANCE   AVOID FACE, Disp: , Rfl:   •  hydrocortisone 2 5 % cream, MIX PEA SZIED DOSE WITH KETOCONAZOLE CREAM AND APPLY TO AFFECTED AREA TWICE A DAY FOR 2 WEEKS THEN 1-2 TIMES PER WEEK AS NEEDED, Disp: , Rfl:   •  ketoconazole (NIZORAL) 2 % cream, APPLY TOPICALLY TO AFFECTED AREA ON FACE, EARS TWICE A DAY AS DIRECTED, Disp: , Rfl:   •  lisinopril (ZESTRIL) 10 mg tablet, Take 1 tablet (10 mg total) by mouth daily, Disp: 90 tablet, Rfl: 1  •  LORazepam (ATIVAN) 0 5 mg tablet, Take 1 tablet (0 5 mg total) by mouth as needed for anxiety, Disp: 30 tablet, Rfl: 0  •  omeprazole (PriLOSEC) 20 mg delayed release capsule, Take 1 capsule (20 mg total) by mouth daily before breakfast, Disp: 90 capsule, Rfl: 1    Past Medical History:   Diagnosis Date   • Deficiency of anterior cruciate ligament 2019   • Diverticulosis     of colon  Last assessed: 12   • Fracture of tibial plateau    • Hypertension    • 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   • Knee joint effusion 2019   • Migraine during menstruating years   • Pap smear for cervical cancer screening     -wnl, HRHPV neg; 10/2022: wnl, HRHPV neg   • Varicella        Past Surgical History:   Procedure Laterality Date   • BREAST BIOPSY Left 2005    Percutaneous Needle Core  Dr Edson Cortés   • CARPECTOMY PROXIMAL ROW Right    • DILATION AND CURETTAGE OF UTERUS  2007    MMR  Polyp benign  Dr Adalid Foster, hysteroscopy   • INDUCED       Surgically induced  By dilation and curettage  24 yo   • OTHER SURGICAL HISTORY  2005    Anterior Colporrhaphy (for pelvic relaxation)  Dr Roscoe Cole   • TUBAL LIGATION Bilateral     23 yo   • URETHRAL SLING  2005    Mid  TOT  Dr Roscoe Cole (c ant colporr, Prolift, extraperitoneal colpopexy   • UTERINE SUSPENSION  2005    Prolift, mesh   Dr Roscoe Cole       Family History   Problem Relation Age of Onset   • Hypertension Mother [de-identified]   • Dementia Father         due tp a series of strokes   • Hypertension Father    • Stroke Father    • Hydrocephalus Father         had shunt   • No Known Problems Sister    • No Known Problems Sister    • Alcohol abuse Brother    • Drug abuse Brother    • Hypertension Brother    • Breast cancer Maternal Grandmother         MA x2   • Dementia Maternal Grandmother         Several a, u's   • Deep vein thrombosis Maternal Grandmother         Pregnancy related   • COPD Maternal Grandfather    • Osteoporosis Paternal Grandmother    • Emphysema Paternal Grandfather    • Heart disease Paternal Grandfather         Ischemic   • Migraines Daughter    • Migraines Son    • Breast cancer Maternal Aunt         MA x2   • Dementia Paternal Aunt         Several a, u's   • Dementia Paternal Uncle         Several a, u's   • Colon cancer Cousin    • Diabetes type I Other         Mellitus   • Lung disease Family    • Ovarian cancer Neg Hx        Social History     Socioeconomic History   • Marital status: /Civil Union     Spouse name: Jayde Vance   • Number of children: 2   • Years of education: None   • Highest education level: High school graduate   Occupational History   • Occupation: Customer Service--Retired     Comment: [employer]   Tobacco Use   • Smoking status: Former     Packs/day: 0 25     Years: 5 00     Pack years: 1 25     Types: Cigarettes     Start date: 1972     Quit date: 1977     Years since quittin 9   • Smokeless tobacco: Never   • Tobacco comments:     Secondhand smoke exposure   smoked in house until    41 yrs 2017   Vaping Use   • Vaping Use: Never used   Substance and Sexual Activity   • Alcohol use: Yes     Comment: seldom, social drinks if any   • Drug use: No   • Sexual activity: Not Currently     Partners: Male     Birth control/protection: Female Sterilization     Comment: lifetime partners: 3;     Other Topics Concern   • None   Social History Narrative    [employer]idism: [employer] blood products            Exercise: none    Calcium: 1 cheese 3-5x/week     Social Determinants of Health     Financial Resource Strain: Low Risk    • Difficulty of Paying Living Expenses: Not hard at all   Food Insecurity: Not on file   Transportation Needs: No Transportation Needs   • Lack of Transportation (Medical): No   • Lack of Transportation (Non-Medical):  No   Physical Activity: Not on file   Stress: Not on file   Social Connections: Not on file   Intimate Partner Violence: Not on file   Housing Stability: Not on file       Exam:   Vitals:    22 1042   BP: 130/80   Pulse: 84   Resp: 18   Temp: 97 5 °F (36 4 °C)   SpO2: 98%           ______________________________________________________    PHYSICAL EXAM  General Appearance:    Alert, cooperative, no distress,      Head:    Normocephalic without obvious abnormality   Eyes:    PERRL, conjunctiva/corneas clear,        Neck:   Supple, no adenopathy, no JVD   Back:     Symmetric, no spinal or CVA tenderness   Lungs:     Clear to auscultation bilaterally,    Heart:    Regular rate and rhythm, S1 and S2 normal, no murmur   Abdomen:     Non-tender in RUQ, LUQ, RLQ, LLQ, no davey's signs  No visible masses or pulsations  Extremities:   Extremities normal  No clubbing, cyanosis or edema   Psych:   Normal Affect   Neurologic:   CNII-XII intact  Strength symmetric, speech intact                 Valentino Harold  Date: 12/8/2022 Time: 11:02 AM       This report has been generated by a voice recognition software system  Therefore, there may be syntax, spelling, and/or grammatical errors  Please call if you've any questions  This  encounter has been billed by a non certified Coder  Informed consent for procedure was personally discussed, reviewed, and signed by Dr Rosa Lock  Discussion by Dr Rosa Lock was carried out regarding risks, benefits, and alternatives with the patient  Risks include but are not limited to:  bleeding, infection, and delayed wound healing or an open wound, pulmonary embolus, leaks from bowel or bile ducts or other viscus, transfusions, death  Discussed in further detail the more common complications and their rates of occurrence   was used if necessary  Patient expressed understanding of the issues discussed and wished/consented to proceed  All questions were answered by Dr Rosa Lock  Discussion performed between patient and the provider signing below       Signature:   Valentino Harold  Date: 12/8/2022 Time: 11:02 AM

## 2022-12-06 NOTE — H&P (VIEW-ONLY)
Assessment/Plan:   Dejuan Peralta is a 74 y  o female who is here for a: Screening Colonoscopy  Plan: Colonoscopy for: Screening for Colon Cancer      Previous Colonoscopy and  Location of polyps if any:   10 years ago and  with polyps in the :   unknown     Actually was 12 years ago  2010 by Dr Giana Stevens  Preoperative Clearance: None    ______________________________________________________    HPI:  Dejuan Peralta is a 74 y  o female who was referred for evaluation of    Screening  Currently:  Symptoms include:  no abdominal pain, change in bowel habits, or black or bloody stools  Family history of colon cancer: None reported       Anticoagulation: none    A1C:     LABS:      Lab Results   Component Value Date    WBC 6 17 07/20/2017    HGB 12 4 07/20/2017    HCT 38 7 07/20/2017    MCV 93 07/20/2017     07/20/2017     Lab Results   Component Value Date     03/10/2015    K 4 4 10/12/2022     10/12/2022    CO2 23 10/12/2022    ANIONGAP 8 03/10/2015    BUN 21 10/12/2022    CREATININE 1 04 10/12/2022    GLUCOSE 103 03/10/2015    GLUF 96 10/12/2022    CALCIUM 9 2 10/12/2022    AST 8 10/12/2022    ALT 21 10/12/2022    ALKPHOS 89 10/12/2022    PROT 7 8 03/10/2015    BILITOT 0 34 03/10/2015    EGFR 56 10/12/2022     No results found for: HGBA1C  No results found for: INR, PROTIME      No orders to display           ROS:  General ROS: negative for - chills, fatigue, fever or night sweats, weight loss  Respiratory ROS: no cough, shortness of breath, or wheezing  Cardiovascular ROS: no chest pain or dyspnea on exertion  Genito-Urinary ROS: no dysuria, trouble voiding, or hematuria  Musculoskeletal ROS: negative for - gait disturbance, joint pain or muscle pain  Neurological ROS: no TIA or stroke symptoms  GI ROS: see HPI  Skin ROS: no new rashes or lesions   Lymphatic ROS: no new adenopathy noted by pt     GYN ROS: see HPI, no new GYN history or bleeding noted  Psy ROS: no new mental or behavioral disturbances           Imaging: No new pertinent imaging studies  Patient Active Problem List   Diagnosis   • Anxiety disorder   • Arthralgia of multiple joints   • Arthritis   • GERD (gastroesophageal reflux disease)   • Hyperlipidemia   • Hypertension   • Migraine headache   • Simple goiter   • Vitamin D deficiency   • Dietary calcium deficiency   • Chronic pain of right wrist   • Encounter for monitoring chronic NSAID therapy   • Colon polyps   • History of COVID-19   • Medicare annual wellness visit, initial   • Stage 3a chronic kidney disease ([facility] [address] )   • Encounter for annual routine gynecological examination   • Visit for screening mammogram   • Inadequate exercise   • BMI 29 0-29 9,adult   • Age-related osteoporosis without current pathological fracture         Allergies:  Patient has no known allergies  Current Outpatient Medications:   •  acetaminophen (TYLENOL) 500 mg tablet, Take 500 mg by mouth every 6 (six) hours as needed, Disp: , Rfl:   •  cholecalciferol (VITAMIN D3) 1,000 units tablet, Take 1 tablet (1,000 Units total) by mouth daily, Disp: 30 tablet, Rfl: 0  •  diphenhydrAMINE-acetaminophen (TYLENOL PM)  MG TABS, Take by mouth, Disp: , Rfl:   •  fluocinolone (SYNALAR) 0 01 % external solution, APPLY TO AFFECTED AREA OF SCALP NIGHTLY FOR 2 WEEKS AND THEN DECREASE TO 2-3 TIMES A WEEK FOR MAINTENANCE   AVOID FACE, Disp: , Rfl:   •  hydrocortisone 2 5 % cream, MIX PEA SZIED DOSE WITH KETOCONAZOLE CREAM AND APPLY TO AFFECTED AREA TWICE A DAY FOR 2 WEEKS THEN 1-2 TIMES PER WEEK AS NEEDED, Disp: , Rfl:   •  ketoconazole (NIZORAL) 2 % cream, APPLY TOPICALLY TO AFFECTED AREA ON FACE, EARS TWICE A DAY AS DIRECTED, Disp: , Rfl:   •  lisinopril (ZESTRIL) 10 mg tablet, Take 1 tablet (10 mg total) by mouth daily, Disp: 90 tablet, Rfl: 1  •  LORazepam (ATIVAN) 0 5 mg tablet, Take 1 tablet (0 5 mg total) by mouth as needed for anxiety, Disp: 30 tablet, Rfl: 0  •  omeprazole (PriLOSEC) 20 mg delayed release capsule, Take 1 capsule (20 mg total) by mouth daily before breakfast, Disp: 90 capsule, Rfl: 1    Past Medical History:   Diagnosis Date   • Deficiency of anterior cruciate ligament 2019   • Diverticulosis     of colon  Last assessed: 12   • Fracture of tibial plateau    • Hypertension    • 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   • Knee joint effusion 2019   • Migraine during menstruating years   • Pap smear for cervical cancer screening     -wnl, HRHPV neg; 10/2022: wnl, HRHPV neg   • Varicella        Past Surgical History:   Procedure Laterality Date   • BREAST BIOPSY Left 2005    Percutaneous Needle Core  Dr Tab Rouse   • CARPECTOMY PROXIMAL ROW Right    • DILATION AND CURETTAGE OF UTERUS  2007    MMR  Polyp benign  Dr James Bray, hysteroscopy   • INDUCED       Surgically induced  By dilation and curettage  26 yo   • OTHER SURGICAL HISTORY  2005    Anterior Colporrhaphy (for pelvic relaxation)  Dr Domenica Will   • TUBAL LIGATION Bilateral     23 yo   • URETHRAL SLING  2005    Mid  TOT  Dr Domenica Will (c ant colporr, Prolift, extraperitoneal colpopexy   • UTERINE SUSPENSION  2005    Prolift, mesh   Dr Domenica Will       Family History   Problem Relation Age of Onset   • Hypertension Mother [de-identified]   • Dementia Father         due tp a series of strokes   • Hypertension Father    • Stroke Father    • Hydrocephalus Father         had shunt   • No Known Problems Sister    • No Known Problems Sister    • Alcohol abuse Brother    • Drug abuse Brother    • Hypertension Brother    • Breast cancer Maternal Grandmother         MA x2   • Dementia Maternal Grandmother         Several a, u's   • Deep vein thrombosis Maternal Grandmother         Pregnancy related   • COPD Maternal Grandfather    • Osteoporosis Paternal Grandmother    • Emphysema Paternal Grandfather    • Heart disease Paternal Grandfather         Ischemic   • Migraines Daughter    • Migraines Son    • Breast cancer Maternal Aunt         MA x2   • Dementia Paternal Aunt         Several a, u's   • Dementia Paternal Uncle         Several a, u's   • Colon cancer Cousin    • Diabetes type I Other         Mellitus   • Lung disease Family    • Ovarian cancer Neg Hx        Social History     Socioeconomic History   • Marital status: /Civil Union     Spouse name: Lois Merritt   • Number of children: 2   • Years of education: None   • Highest education level: High school graduate   Occupational History   • Occupation: Customer Service--Retired     Comment: [employer]   Tobacco Use   • Smoking status: Former     Packs/day: 0 25     Years: 5 00     Pack years: 1 25     Types: Cigarettes     Start date: 1972     Quit date: 1977     Years since quittin 9   • Smokeless tobacco: Never   • Tobacco comments:     Secondhand smoke exposure   smoked in house until    41 yrs 2017   Vaping Use   • Vaping Use: Never used   Substance and Sexual Activity   • Alcohol use: Yes     Comment: seldom, social drinks if any   • Drug use: No   • Sexual activity: Not Currently     Partners: Male     Birth control/protection: Female Sterilization     Comment: lifetime partners: 3;     Other Topics Concern   • None   Social History Narrative    [employer]regation: [employer] blood products            Exercise: none    Calcium: 1 cheese 3-5x/week     Social Determinants of Health     Financial Resource Strain: Low Risk    • Difficulty of Paying Living Expenses: Not hard at all   Food Insecurity: Not on file   Transportation Needs: No Transportation Needs   • Lack of Transportation (Medical): No   • Lack of Transportation (Non-Medical):  No   Physical Activity: Not on file   Stress: Not on file   Social Connections: Not on file   Intimate Partner Violence: Not on file   Housing Stability: Not on file       Exam:   Vitals:    22 1042   BP: 130/80   Pulse: 84   Resp: 18   Temp: 97 5 °F (36 4 °C)   SpO2: 98%           ______________________________________________________    PHYSICAL EXAM  General Appearance:    Alert, cooperative, no distress,      Head:    Normocephalic without obvious abnormality   Eyes:    PERRL, conjunctiva/corneas clear,        Neck:   Supple, no adenopathy, no JVD   Back:     Symmetric, no spinal or CVA tenderness   Lungs:     Clear to auscultation bilaterally,    Heart:    Regular rate and rhythm, S1 and S2 normal, no murmur   Abdomen:     Non-tender in RUQ, LUQ, RLQ, LLQ, no davey's signs  No visible masses or pulsations  Extremities:   Extremities normal  No clubbing, cyanosis or edema   Psych:   Normal Affect   Neurologic:   CNII-XII intact  Strength symmetric, speech intact                 Denise Fofana  Date: 12/8/2022 Time: 11:02 AM       This report has been generated by a voice recognition software system  Therefore, there may be syntax, spelling, and/or grammatical errors  Please call if you've any questions  This  encounter has been billed by a non certified Coder  Informed consent for procedure was personally discussed, reviewed, and signed by Dr Andrea Kelley  Discussion by Dr Andrea Kelley was carried out regarding risks, benefits, and alternatives with the patient  Risks include but are not limited to:  bleeding, infection, and delayed wound healing or an open wound, pulmonary embolus, leaks from bowel or bile ducts or other viscus, transfusions, death  Discussed in further detail the more common complications and their rates of occurrence   was used if necessary  Patient expressed understanding of the issues discussed and wished/consented to proceed  All questions were answered by Dr Andrea Kelley  Discussion performed between patient and the provider signing below       Signature:   Denise Fofana  Date: 12/8/2022 Time: 11:02 AM

## 2022-12-08 ENCOUNTER — OFFICE VISIT (OUTPATIENT)
Dept: SURGERY | Facility: CLINIC | Age: 67
End: 2022-12-08

## 2022-12-08 VITALS
OXYGEN SATURATION: 98 % | TEMPERATURE: 97.5 F | DIASTOLIC BLOOD PRESSURE: 80 MMHG | SYSTOLIC BLOOD PRESSURE: 130 MMHG | HEIGHT: 63 IN | HEART RATE: 84 BPM | RESPIRATION RATE: 18 BRPM | BODY MASS INDEX: 28.17 KG/M2 | WEIGHT: 159 LBS

## 2022-12-08 DIAGNOSIS — Z12.11 ENCOUNTER FOR SCREENING COLONOSCOPY: Primary | ICD-10-CM

## 2022-12-11 RX ORDER — SODIUM CHLORIDE 9 MG/ML
125 INJECTION, SOLUTION INTRAVENOUS CONTINUOUS
Status: CANCELLED | OUTPATIENT
Start: 2022-12-11

## 2022-12-13 ENCOUNTER — ANESTHESIA (OUTPATIENT)
Dept: GASTROENTEROLOGY | Facility: HOSPITAL | Age: 67
End: 2022-12-13

## 2022-12-13 ENCOUNTER — HOSPITAL ENCOUNTER (OUTPATIENT)
Dept: GASTROENTEROLOGY | Facility: HOSPITAL | Age: 67
Setting detail: OUTPATIENT SURGERY
Discharge: HOME | End: 2022-12-13
Attending: SURGERY

## 2022-12-13 ENCOUNTER — ANESTHESIA EVENT (OUTPATIENT)
Dept: GASTROENTEROLOGY | Facility: HOSPITAL | Age: 67
End: 2022-12-13

## 2022-12-13 VITALS
SYSTOLIC BLOOD PRESSURE: 91 MMHG | WEIGHT: 158 LBS | OXYGEN SATURATION: 95 % | HEART RATE: 80 BPM | DIASTOLIC BLOOD PRESSURE: 55 MMHG | RESPIRATION RATE: 20 BRPM | HEIGHT: 63 IN | TEMPERATURE: 98 F | BODY MASS INDEX: 28 KG/M2

## 2022-12-13 DIAGNOSIS — K63.5 POLYP OF COLON, UNSPECIFIED PART OF COLON, UNSPECIFIED TYPE: ICD-10-CM

## 2022-12-13 RX ORDER — LIDOCAINE HYDROCHLORIDE 20 MG/ML
INJECTION, SOLUTION EPIDURAL; INFILTRATION; INTRACAUDAL; PERINEURAL AS NEEDED
Status: DISCONTINUED | OUTPATIENT
Start: 2022-12-13 | End: 2022-12-13

## 2022-12-13 RX ORDER — PROPOFOL 10 MG/ML
INJECTION, EMULSION INTRAVENOUS AS NEEDED
Status: DISCONTINUED | OUTPATIENT
Start: 2022-12-13 | End: 2022-12-13

## 2022-12-13 RX ORDER — SODIUM CHLORIDE 9 MG/ML
125 INJECTION, SOLUTION INTRAVENOUS CONTINUOUS
Status: DISCONTINUED | OUTPATIENT
Start: 2022-12-13 | End: 2022-12-17 | Stop reason: HOSPADM

## 2022-12-13 RX ADMIN — PROPOFOL 20 MG: 10 INJECTION, EMULSION INTRAVENOUS at 09:33

## 2022-12-13 RX ADMIN — SODIUM CHLORIDE: 0.9 INJECTION, SOLUTION INTRAVENOUS at 09:17

## 2022-12-13 RX ADMIN — PROPOFOL 40 MG: 10 INJECTION, EMULSION INTRAVENOUS at 09:27

## 2022-12-13 RX ADMIN — PROPOFOL 100 MG: 10 INJECTION, EMULSION INTRAVENOUS at 09:23

## 2022-12-13 RX ADMIN — LIDOCAINE HYDROCHLORIDE 40 MG: 20 INJECTION, SOLUTION EPIDURAL; INFILTRATION; INTRACAUDAL; PERINEURAL at 09:23

## 2022-12-13 RX ADMIN — PROPOFOL 40 MG: 10 INJECTION, EMULSION INTRAVENOUS at 09:30

## 2022-12-13 NOTE — ANESTHESIA POSTPROCEDURE EVALUATION
Post-Op Assessment Note    CV Status:  Stable    Pain management: adequate     Mental Status:  Alert and awake   Hydration Status:  Euvolemic   PONV Controlled:  Controlled   Airway Patency:  Patent      Post Op Vitals Reviewed: Yes      Staff: Anesthesiologist, CRNA         No notable events documented      BP      Temp      Pulse     Resp      SpO2      BP 91/55   Pulse 80   Temp 98 °F (36 7 °C) (Temporal)   Resp 20   Ht 5' 2 5" (1 588 m)   Wt 71 7 kg (158 lb)   SpO2 95%   BMI 28 44 kg/m²

## 2022-12-13 NOTE — ANESTHESIA PREPROCEDURE EVALUATION
Procedure:  COLONOSCOPY    Relevant Problems   CARDIO   (+) Hyperlipidemia   (+) Hypertension   (+) Migraine headache      GI/HEPATIC   (+) GERD (gastroesophageal reflux disease)      /RENAL   (+) Stage 3a chronic kidney disease (HCC)      MUSCULOSKELETAL   (+) Arthritis      NEURO/PSYCH   (+) Anxiety disorder   (+) Chronic pain of right wrist   (+) History of COVID-19   (+) Migraine headache        Physical Exam    Airway    Mallampati score: II  TM Distance: >3 FB  Neck ROM: full     Dental   No notable dental hx     Cardiovascular  Rhythm: regular, Rate: normal, Cardiovascular exam normal    Pulmonary  Pulmonary exam normal Breath sounds clear to auscultation,     Other Findings        Anesthesia Plan  ASA Score- 2     Anesthesia Type- IV sedation with anesthesia with ASA Monitors  Additional Monitors:   Airway Plan:           Plan Factors-    Chart reviewed  Patient summary reviewed  Patient is not a current smoker  Patient not instructed to abstain from smoking on day of procedure  Patient did not smoke on day of surgery  Induction- intravenous  Postoperative Plan-     Informed Consent- Anesthetic plan and risks discussed with patient  I personally reviewed this patient with the CRNA  Discussed and agreed on the Anesthesia Plan with the CRNA  Omero Barrera

## 2022-12-13 NOTE — DISCHARGE INSTR - AVS FIRST PAGE
[facility]  Endoscopy Post-Operative Instructions  Dr Wagner Cabrales MD, FACS    Procedure: Colonoscopy    Findings:  Diverticulosis and Poor preparation, will need follow up    Follow-Up: You will need a repeat Endoscopy in (generally)1 year  Will await final pathology report for final determination of number of years until your follow up endoscopy, if you had polyps on this exam   Different types of polyps require different lengths of follow up surveillance  Please call our office or your primary doctor's office if you have any questions, once the report is returned  You should have an endoscopy sooner than recommended if you have any symptoms of bleeding or change in stools or other concerns  You will receive a call from our office with your results, in addition to the the preliminary results you received today  You will usually receive a follow-up letter from our office in 1-2 weeks  Call the office if you do not hear from us  You are welcome to also schedule an office visit if desired to discuss the results further  It is your responsibility to contact our office for results in 1- 2 weeks if you do not hear from us  If a follow up endoscopy is needed, you are responsible for arranging that follow up appointment at the appropriate time  The office may or may not issue a reminder at that future time  Please take responsibility for your own follow up healthcare  Diet: Eat a light snack first, and then resume your previous diet  Call the office if you have unusual fevers, chills, nausea or vomiting or abdominal pain  Report to the emergency room with these are severe in nature  Activity: Do not drive a car, operate machinery, or sign legal documents for 24 hours after your procedure  Normal activity may be resumed on the day following the procedure       Call the office at [phone] for any of the following: Severe abdominal pain, significant rectal bleeding, chills, or fever above 100°, new onset of persistent cough or persistent vomiting       [address], [address]  [place], [address]   Phone: [phone]

## 2022-12-13 NOTE — INTERVAL H&P NOTE
H&P reviewed  After examining the patient I find no changes in the patients condition since the H&P had been written      Vitals:    12/13/22 0842   BP: 120/61   Pulse: 87   Resp: 19   Temp: 98 °F (36 7 °C)   SpO2: 97%

## 2022-12-14 ENCOUNTER — TELEPHONE (OUTPATIENT)
Dept: SURGERY | Facility: CLINIC | Age: 67
End: 2022-12-14

## 2022-12-14 DIAGNOSIS — K21.9 GASTROESOPHAGEAL REFLUX DISEASE, UNSPECIFIED WHETHER ESOPHAGITIS PRESENT: ICD-10-CM

## 2022-12-14 RX ORDER — OMEPRAZOLE 20 MG/1
CAPSULE, DELAYED RELEASE ORAL
Qty: 90 CAPSULE | Refills: 1 | Status: SHIPPED | OUTPATIENT
Start: 2022-12-14

## 2022-12-14 NOTE — TELEPHONE ENCOUNTER
S/P = COLONOSCOPY = 12/13/2022    Spoke with patient  She denies having any F/V/N/C and has not had a BM yet  Patient is aware she had poor prep so lesions/polyps may have been missed - recommended repeat colonoscopy in 1 year  She was advised a different prep is recommended for next year to see if that works better  No path pending  Advised to call with any questions, concerns, problems - or if any personal history/symptoms or family history changes from now until next year

## 2022-12-17 PROBLEM — Z00.00 MEDICARE ANNUAL WELLNESS VISIT, INITIAL: Status: RESOLVED | Noted: 2022-10-18 | Resolved: 2022-12-17

## 2023-02-13 DIAGNOSIS — I10 ESSENTIAL HYPERTENSION: ICD-10-CM

## 2023-02-13 RX ORDER — LISINOPRIL 10 MG/1
10 TABLET ORAL DAILY
Qty: 90 TABLET | Refills: 3 | Status: SHIPPED | OUTPATIENT
Start: 2023-02-13

## 2023-06-08 DIAGNOSIS — K21.9 GASTROESOPHAGEAL REFLUX DISEASE, UNSPECIFIED WHETHER ESOPHAGITIS PRESENT: ICD-10-CM

## 2023-06-08 RX ORDER — OMEPRAZOLE 20 MG/1
20 CAPSULE, DELAYED RELEASE ORAL
Qty: 90 CAPSULE | Refills: 0 | Status: SHIPPED | OUTPATIENT
Start: 2023-06-08

## 2023-08-31 DIAGNOSIS — K21.9 GASTROESOPHAGEAL REFLUX DISEASE, UNSPECIFIED WHETHER ESOPHAGITIS PRESENT: ICD-10-CM

## 2023-08-31 RX ORDER — OMEPRAZOLE 20 MG/1
20 CAPSULE, DELAYED RELEASE ORAL
Qty: 90 CAPSULE | Refills: 0 | Status: SHIPPED | OUTPATIENT
Start: 2023-08-31

## 2023-10-09 ENCOUNTER — HOSPITAL ENCOUNTER (OUTPATIENT)
Dept: MAMMOGRAPHY | Facility: MEDICAL CENTER | Age: 68
Discharge: HOME | End: 2023-10-09
Payer: COMMERCIAL

## 2023-10-09 VITALS — HEIGHT: 63 IN | BODY MASS INDEX: 28 KG/M2 | WEIGHT: 158 LBS

## 2023-10-09 DIAGNOSIS — Z12.31 VISIT FOR SCREENING MAMMOGRAM: ICD-10-CM

## 2023-10-09 PROCEDURE — 77063 BREAST TOMOSYNTHESIS BI: CPT

## 2023-10-09 PROCEDURE — 77067 SCR MAMMO BI INCL CAD: CPT

## 2023-11-13 DIAGNOSIS — I10 ESSENTIAL HYPERTENSION: ICD-10-CM

## 2023-11-13 DIAGNOSIS — I10 PRIMARY HYPERTENSION: ICD-10-CM

## 2023-11-13 DIAGNOSIS — E55.9 VITAMIN D DEFICIENCY: ICD-10-CM

## 2023-11-13 DIAGNOSIS — E78.5 HYPERLIPIDEMIA, UNSPECIFIED HYPERLIPIDEMIA TYPE: Primary | ICD-10-CM

## 2023-11-13 RX ORDER — LISINOPRIL 10 MG/1
10 TABLET ORAL DAILY
Qty: 30 TABLET | Refills: 0 | Status: SHIPPED | OUTPATIENT
Start: 2023-11-13

## 2023-11-13 NOTE — TELEPHONE ENCOUNTER
No f/u scheduled last seen 10/18/22 KB can you please place labs that you want pt to get done and send 30 days if appropriate.

## 2023-12-04 DIAGNOSIS — K21.9 GASTROESOPHAGEAL REFLUX DISEASE, UNSPECIFIED WHETHER ESOPHAGITIS PRESENT: ICD-10-CM

## 2023-12-05 RX ORDER — OMEPRAZOLE 20 MG/1
20 CAPSULE, DELAYED RELEASE ORAL
Qty: 30 CAPSULE | Refills: 0 | Status: SHIPPED | OUTPATIENT
Start: 2023-12-05

## 2023-12-11 DIAGNOSIS — I10 ESSENTIAL HYPERTENSION: ICD-10-CM

## 2023-12-12 RX ORDER — LISINOPRIL 10 MG/1
10 TABLET ORAL DAILY
Qty: 30 TABLET | Refills: 0 | Status: SHIPPED | OUTPATIENT
Start: 2023-12-12

## 2024-01-12 ENCOUNTER — TELEPHONE (OUTPATIENT)
Dept: FAMILY MEDICINE CLINIC | Facility: CLINIC | Age: 69
End: 2024-01-12

## 2024-01-12 DIAGNOSIS — I10 ESSENTIAL HYPERTENSION: ICD-10-CM

## 2024-01-15 ENCOUNTER — TELEPHONE (OUTPATIENT)
Dept: FAMILY MEDICINE CLINIC | Facility: CLINIC | Age: 69
End: 2024-01-15

## 2024-01-15 RX ORDER — LISINOPRIL 10 MG/1
10 TABLET ORAL DAILY
Qty: 90 TABLET | Refills: 0 | Status: SHIPPED | OUTPATIENT
Start: 2024-01-15

## 2024-01-15 NOTE — TELEPHONE ENCOUNTER
Called pt to schedule appointment, made for 1/23. Pt would like to know if she needs to get labwork done prior to appointment - can she please be called?     Pt also requesting refill of omeprazole.

## 2024-01-18 ENCOUNTER — APPOINTMENT (OUTPATIENT)
Dept: LAB | Facility: CLINIC | Age: 69
End: 2024-01-18
Payer: COMMERCIAL

## 2024-01-18 DIAGNOSIS — E78.5 HYPERLIPIDEMIA, UNSPECIFIED HYPERLIPIDEMIA TYPE: ICD-10-CM

## 2024-01-18 DIAGNOSIS — E55.9 VITAMIN D DEFICIENCY: ICD-10-CM

## 2024-01-18 DIAGNOSIS — I10 PRIMARY HYPERTENSION: ICD-10-CM

## 2024-01-18 LAB
25(OH)D3 SERPL-MCNC: 79.7 NG/ML (ref 30–100)
ALBUMIN SERPL BCP-MCNC: 4.2 G/DL (ref 3.5–5)
ALP SERPL-CCNC: 75 U/L (ref 34–104)
ALT SERPL W P-5'-P-CCNC: 13 U/L (ref 7–52)
ANION GAP SERPL CALCULATED.3IONS-SCNC: 10 MMOL/L
AST SERPL W P-5'-P-CCNC: 11 U/L (ref 13–39)
BASOPHILS # BLD AUTO: 0.05 THOUSANDS/ÂΜL (ref 0–0.1)
BASOPHILS NFR BLD AUTO: 1 % (ref 0–1)
BILIRUB SERPL-MCNC: 0.58 MG/DL (ref 0.2–1)
BUN SERPL-MCNC: 19 MG/DL (ref 5–25)
CALCIUM SERPL-MCNC: 9.2 MG/DL (ref 8.4–10.2)
CHLORIDE SERPL-SCNC: 105 MMOL/L (ref 96–108)
CHOLEST SERPL-MCNC: 166 MG/DL
CO2 SERPL-SCNC: 27 MMOL/L (ref 21–32)
CREAT SERPL-MCNC: 0.84 MG/DL (ref 0.6–1.3)
EOSINOPHIL # BLD AUTO: 0.15 THOUSAND/ÂΜL (ref 0–0.61)
EOSINOPHIL NFR BLD AUTO: 2 % (ref 0–6)
ERYTHROCYTE [DISTWIDTH] IN BLOOD BY AUTOMATED COUNT: 13.2 % (ref 11.6–15.1)
GFR SERPL CREATININE-BSD FRML MDRD: 72 ML/MIN/1.73SQ M
GLUCOSE P FAST SERPL-MCNC: 85 MG/DL (ref 65–99)
HCT VFR BLD AUTO: 40.1 % (ref 34.8–46.1)
HDLC SERPL-MCNC: 60 MG/DL
HGB BLD-MCNC: 12.4 G/DL (ref 11.5–15.4)
IMM GRANULOCYTES # BLD AUTO: 0.02 THOUSAND/UL (ref 0–0.2)
IMM GRANULOCYTES NFR BLD AUTO: 0 % (ref 0–2)
LDLC SERPL CALC-MCNC: 84 MG/DL (ref 0–100)
LYMPHOCYTES # BLD AUTO: 2.65 THOUSANDS/ÂΜL (ref 0.6–4.47)
LYMPHOCYTES NFR BLD AUTO: 37 % (ref 14–44)
MCH RBC QN AUTO: 29.3 PG (ref 26.8–34.3)
MCHC RBC AUTO-ENTMCNC: 30.9 G/DL (ref 31.4–37.4)
MCV RBC AUTO: 95 FL (ref 82–98)
MONOCYTES # BLD AUTO: 0.5 THOUSAND/ÂΜL (ref 0.17–1.22)
MONOCYTES NFR BLD AUTO: 7 % (ref 4–12)
NEUTROPHILS # BLD AUTO: 3.8 THOUSANDS/ÂΜL (ref 1.85–7.62)
NEUTS SEG NFR BLD AUTO: 53 % (ref 43–75)
NRBC BLD AUTO-RTO: 0 /100 WBCS
PLATELET # BLD AUTO: 316 THOUSANDS/UL (ref 149–390)
PMV BLD AUTO: 9.6 FL (ref 8.9–12.7)
POTASSIUM SERPL-SCNC: 4.3 MMOL/L (ref 3.5–5.3)
PROT SERPL-MCNC: 6.7 G/DL (ref 6.4–8.4)
RBC # BLD AUTO: 4.23 MILLION/UL (ref 3.81–5.12)
SODIUM SERPL-SCNC: 142 MMOL/L (ref 135–147)
TRIGL SERPL-MCNC: 108 MG/DL
WBC # BLD AUTO: 7.17 THOUSAND/UL (ref 4.31–10.16)

## 2024-01-23 ENCOUNTER — OFFICE VISIT (OUTPATIENT)
Dept: FAMILY MEDICINE CLINIC | Facility: CLINIC | Age: 69
End: 2024-01-23
Payer: COMMERCIAL

## 2024-01-23 VITALS
HEIGHT: 63 IN | HEART RATE: 87 BPM | DIASTOLIC BLOOD PRESSURE: 74 MMHG | SYSTOLIC BLOOD PRESSURE: 116 MMHG | WEIGHT: 160 LBS | OXYGEN SATURATION: 97 % | BODY MASS INDEX: 28.35 KG/M2

## 2024-01-23 DIAGNOSIS — I10 PRIMARY HYPERTENSION: Primary | ICD-10-CM

## 2024-01-23 DIAGNOSIS — E55.9 VITAMIN D DEFICIENCY: ICD-10-CM

## 2024-01-23 DIAGNOSIS — Z98.890 HISTORY OF BLADDER SURGERY: ICD-10-CM

## 2024-01-23 DIAGNOSIS — Z23 ENCOUNTER FOR IMMUNIZATION: ICD-10-CM

## 2024-01-23 DIAGNOSIS — K63.5 POLYP OF COLON, UNSPECIFIED PART OF COLON, UNSPECIFIED TYPE: ICD-10-CM

## 2024-01-23 DIAGNOSIS — E78.5 HYPERLIPIDEMIA, UNSPECIFIED HYPERLIPIDEMIA TYPE: ICD-10-CM

## 2024-01-23 DIAGNOSIS — N18.31 STAGE 3A CHRONIC KIDNEY DISEASE (HCC): ICD-10-CM

## 2024-01-23 DIAGNOSIS — K21.9 GASTROESOPHAGEAL REFLUX DISEASE, UNSPECIFIED WHETHER ESOPHAGITIS PRESENT: ICD-10-CM

## 2024-01-23 DIAGNOSIS — M81.0 AGE-RELATED OSTEOPOROSIS WITHOUT CURRENT PATHOLOGICAL FRACTURE: ICD-10-CM

## 2024-01-23 PROBLEM — K57.90 DIVERTICULOSIS: Status: ACTIVE | Noted: 2024-01-23

## 2024-01-23 PROBLEM — Z79.1 ENCOUNTER FOR MONITORING CHRONIC NSAID THERAPY: Status: RESOLVED | Noted: 2019-03-28 | Resolved: 2024-01-23

## 2024-01-23 PROBLEM — Z01.419 ENCOUNTER FOR ANNUAL ROUTINE GYNECOLOGICAL EXAMINATION: Status: RESOLVED | Noted: 2022-10-20 | Resolved: 2024-01-23

## 2024-01-23 PROBLEM — Z51.81 ENCOUNTER FOR MONITORING CHRONIC NSAID THERAPY: Status: RESOLVED | Noted: 2019-03-28 | Resolved: 2024-01-23

## 2024-01-23 PROBLEM — Z12.31 VISIT FOR SCREENING MAMMOGRAM: Status: RESOLVED | Noted: 2022-10-20 | Resolved: 2024-01-23

## 2024-01-23 PROCEDURE — G0009 ADMIN PNEUMOCOCCAL VACCINE: HCPCS | Performed by: PHYSICIAN ASSISTANT

## 2024-01-23 PROCEDURE — 99214 OFFICE O/P EST MOD 30 MIN: CPT | Performed by: PHYSICIAN ASSISTANT

## 2024-01-23 PROCEDURE — G0439 PPPS, SUBSEQ VISIT: HCPCS | Performed by: PHYSICIAN ASSISTANT

## 2024-01-23 PROCEDURE — 90677 PCV20 VACCINE IM: CPT | Performed by: PHYSICIAN ASSISTANT

## 2024-01-23 RX ORDER — OMEPRAZOLE 20 MG/1
20 CAPSULE, DELAYED RELEASE ORAL
Qty: 90 CAPSULE | Refills: 3 | Status: SHIPPED | OUTPATIENT
Start: 2024-01-23

## 2024-01-23 NOTE — ASSESSMENT & PLAN NOTE
Patient's LDL is great at 84 HDL and triglycerides also great at 60 and 108 respectively.  Check yearly.

## 2024-01-23 NOTE — PROGRESS NOTES
" Assessment and Plan:     Problem List Items Addressed This Visit        Digestive    GERD (gastroesophageal reflux disease)     Stable on PPI.         Relevant Medications    omeprazole (PriLOSEC) 20 mg delayed release capsule    Colon polyps     Pt. had colonoscopy 12/2022 with poor pre and recommended repeat is 1 year. Pt does not want to go back at this time.             Cardiovascular and Mediastinum    Hypertension - Primary     Blood pressure well-controlled on Zestril 10.            Musculoskeletal and Integument    Age-related osteoporosis without current pathological fracture     DEXA done in October 2022 showed a T-score of -2.8.  Patient opted for not using medications increasing weightbearing activity vitamin D calcium goal is 1200 mg a day which is usually 60 mg twice daily.  Recheck this year.            Genitourinary    Stage 3a chronic kidney disease (HCC)     Lab Results   Component Value Date    EGFR 72 01/18/2024    EGFR 56 10/12/2022    EGFR 59 06/02/2021    CREATININE 0.84 01/18/2024    CREATININE 1.04 10/12/2022    CREATININE 1.02 06/02/2021   GFR has markedly improved from the 50s to the 70s with a normal BUN/creatinine.            Other    Hyperlipidemia     Patient's LDL is great at 84 HDL and triglycerides also great at 60 and 108 respectively.  Check yearly.         Vitamin D deficiency     Vitamin D was great at 79 continue supplementation.         History of bladder surgery     With a history of bladder sling placed by Dr. Ragland in 2005.  She states that if she does not drink enough fluids her bladder \"acts up\".  I am not quite sure what she is speaking about but she does have an upcoming GYN evaluation with Dr. Rowe.        Other Visit Diagnoses     Encounter for immunization        Relevant Orders    Pneumococcal Conjugate Vaccine 20-valent (Pcv20) (Completed)          Depression Screening and Follow-up Plan: Patient was screened for depression during today's encounter. They " screened negative with a PHQ-2 score of 0.    Falls Plan of Care: balance, strength, and gait training instructions were provided.     Urinary Incontinence Plan of Care: counseling topics discussed: practice Kegel (pelvic floor strengthening) exercises.       Preventive health issues were discussed with patient, and age appropriate screening tests were ordered as noted in patient's After Visit Summary.  Personalized health advice and appropriate referrals for health education or preventive services given if needed, as noted in patient's After Visit Summary.     History of Present Illness:     Patient presents for a Medicare Wellness Visit      Yudi Barnhart is here for chronic conditions f/u. Pt. had labs done prior to today's visit which included Recent Results (from the past 672 hour(s))  -CBC and differential:   Collection Time: 01/18/24  9:21 AM       Result                      Value             Ref Range           WBC                         7.17              4.31 - 10.16*       RBC                         4.23              3.81 - 5.12 *       Hemoglobin                  12.4              11.5 - 15.4 *       Hematocrit                  40.1              34.8 - 46.1 %       MCV                         95                82 - 98 fL          MCH                         29.3              26.8 - 34.3 *       MCHC                        30.9 (L)          31.4 - 37.4 *       RDW                         13.2              11.6 - 15.1 %       MPV                         9.6               8.9 - 12.7 fL       Platelets                   316               149 - 390 Th*       nRBC                        0                 /100 WBCs           Neutrophils Relative        53                43 - 75 %           Immat GRANS %               0                 0 - 2 %             Lymphocytes Relative        37                14 - 44 %           Monocytes Relative          7                 4 - 12 %            Eosinophils Relative         2                 0 - 6 %             Basophils Relative          1                 0 - 1 %             Neutrophils Absolute        3.80              1.85 - 7.62 *       Immature Grans Absolute     0.02              0.00 - 0.20 *       Lymphocytes Absolute        2.65              0.60 - 4.47 *       Monocytes Absolute          0.50              0.17 - 1.22 *       Eosinophils Absolute        0.15              0.00 - 0.61 *       Basophils Absolute          0.05              0.00 - 0.10 *  -Comprehensive metabolic panel:   Collection Time: 01/18/24  9:21 AM       Result                      Value             Ref Range           Sodium                      142               135 - 147 mm*       Potassium                   4.3               3.5 - 5.3 mm*       Chloride                    105               96 - 108 mmo*       CO2                         27                21 - 32 mmol*       ANION GAP                   10                mmol/L              BUN                         19                5 - 25 mg/dL        Creatinine                  0.84              0.60 - 1.30 *       Glucose, Fasting            85                65 - 99 mg/dL       Calcium                     9.2               8.4 - 10.2 m*       AST                         11 (L)            13 - 39 U/L         ALT                         13                7 - 52 U/L          Alkaline Phosphatase        75                34 - 104 U/L        Total Protein               6.7               6.4 - 8.4 g/*       Albumin                     4.2               3.5 - 5.0 g/*       Total Bilirubin             0.58              0.20 - 1.00 *       eGFR                        72                ml/min/1.73s*  -Lipid Panel with Direct LDL reflex:   Collection Time: 01/18/24  9:21 AM       Result                      Value             Ref Range           Cholesterol                 166               See Comment *       Triglycerides               108               See  Comment *       HDL, Direct                 60                >=50 mg/dL          LDL Calculated              84                0 - 100 mg/dL  -Vitamin D 25 hydroxy:   Collection Time: 01/18/24  9:21 AM       Result                      Value             Ref Range           Vit D, 25-Hydroxy           79.7              30.0 - 100.0*         Patient Care Team:  Nelly Newby PA-C as PCP - General (Family Medicine)  MD Kadeem Herrera MD     Review of Systems:     Review of Systems   Constitutional: Negative.    HENT: Negative.     Eyes: Negative.    Respiratory: Negative.     Cardiovascular: Negative.    Gastrointestinal: Negative.    Endocrine: Negative.    Genitourinary: Negative.    Musculoskeletal: Negative.    Skin: Negative.    Allergic/Immunologic: Negative.    Neurological: Negative.    Hematological: Negative.    Psychiatric/Behavioral: Negative.          Problem List:     Patient Active Problem List   Diagnosis   • Anxiety disorder   • Arthralgia of multiple joints   • Arthritis   • GERD (gastroesophageal reflux disease)   • Hyperlipidemia   • Hypertension   • Migraine headache   • Simple goiter   • Vitamin D deficiency   • Dietary calcium deficiency   • Chronic pain of right wrist   • Colon polyps   • History of COVID-19   • Stage 3a chronic kidney disease (HCC)   • Inadequate exercise   • Age-related osteoporosis without current pathological fracture   • Diverticulosis   • History of bladder surgery      Past Medical and Surgical History:     Past Medical History:   Diagnosis Date   • 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 Dr. Law   • CARPECTOMY PROXIMAL ROW Right    • COLONOSCOPY  2022    ONE YEAR RECOMMENDED = POOR PREP   • DILATION AND CURETTAGE OF UTERUS  2007    MMR. Polyp benign. Dr. Lofton, hysteroscopy   • INDUCED       Surgically induced. By dilation and curettage. 22 yo   • OTHER SURGICAL HISTORY  2005    Anterior Colporrhaphy (for pelvic relaxation). Dr. Cain   • TUBAL LIGATION Bilateral     28 yo   • URETHRAL SLING  2005    Mid. TOT. Dr. Cain (c ant colporr, Prolift, extraperitoneal colpopexy   • UTERINE SUSPENSION  2005    Prolift, mesh. Dr. Cain      Family History:     Family History   Problem Relation Age of Onset   • Hypertension Mother 80   • Dementia Father         due tp a series of strokes   • Hypertension Father    • Stroke Father    • Hydrocephalus Father         had shunt   • No Known Problems Sister    • No Known Problems Sister    • Alcohol abuse Brother    • Drug abuse Brother    • Hypertension Brother    • Breast cancer Maternal Grandmother         MA x2   • Dementia Maternal Grandmother         Several a, u's   • Deep vein thrombosis Maternal Grandmother         Pregnancy related   • COPD Maternal Grandfather    • Osteoporosis Paternal Grandmother    • Emphysema Paternal Grandfather    • Heart disease Paternal Grandfather         Ischemic   • Migraines Daughter    • Migraines Son    • Breast cancer Maternal Aunt         MA x2   • Dementia Paternal Aunt         Several a, u's   • Dementia Paternal Uncle         Several a, u's   • Colon cancer Cousin    • Diabetes type I Other         Mellitus   • Lung disease Family    • Ovarian cancer Neg Hx       Social History:     Social History     Socioeconomic History   • Marital status: /Civil Union     Spouse name: Shemar   • Number of children: 2   • Years of education: None    • Highest education level: High school graduate   Occupational History   • Occupation: Customer Service--Retired     Comment: [employer]   Tobacco Use   • Smoking status: Former     Current packs/day: 0.00     Average packs/day: 0.3 packs/day for 5.0 years (1.3 ttl pk-yrs)     Types: Cigarettes     Start date: 1972     Quit date: 1977     Years since quittin.0   • Smokeless tobacco: Never   • Tobacco comments:     Secondhand smoke exposure.  smoked in house until .  41 yrs 2017   Vaping Use   • Vaping status: Never Used   Substance and Sexual Activity   • Alcohol use: Yes     Comment: seldom, social drinks if any   • Drug use: No   • Sexual activity: Not Currently     Partners: Male     Birth control/protection: Female Sterilization     Comment: lifetime partners: 3;     Other Topics Concern   • None   Social History Narrative    Yazidism: Taoism    Accepts blood products            Exercise: none    Calcium: 1 cheese 3-5x/week     Social Determinants of Health     Financial Resource Strain: Low Risk  (2024)    Overall Financial Resource Strain (CARDIA)    • Difficulty of Paying Living Expenses: Not very hard   Food Insecurity: Not on file   Transportation Needs: No Transportation Needs (2024)    PRAPARE - Transportation    • Lack of Transportation (Medical): No    • Lack of Transportation (Non-Medical): No   Physical Activity: Not on file   Stress: Not on file   Social Connections: Not on file   Intimate Partner Violence: Not on file   Housing Stability: Not on file      Medications and Allergies:     Current Outpatient Medications   Medication Sig Dispense Refill   • acetaminophen (TYLENOL) 500 mg tablet Take 500 mg by mouth every 6 (six) hours as needed     • cholecalciferol (VITAMIN D3) 1,000 units tablet Take 1 tablet (1,000 Units total) by mouth daily 30 tablet 0   • diphenhydrAMINE-acetaminophen (TYLENOL PM)  MG TABS Take by mouth     •  fluocinolone (SYNALAR) 0.01 % external solution APPLY TO AFFECTED AREA OF SCALP NIGHTLY FOR 2 WEEKS AND THEN DECREASE TO 2-3 TIMES A WEEK FOR MAINTENANCE. AVOID FACE     • hydrocortisone 2.5 % cream MIX PEA SZIED DOSE WITH KETOCONAZOLE CREAM AND APPLY TO AFFECTED AREA TWICE A DAY FOR 2 WEEKS THEN 1-2 TIMES PER WEEK AS NEEDED     • ketoconazole (NIZORAL) 2 % cream APPLY TOPICALLY TO AFFECTED AREA ON FACE, EARS TWICE A DAY AS DIRECTED     • lisinopril (ZESTRIL) 10 mg tablet TAKE ONE TABLET BY MOUTH EVERY DAY 90 tablet 0   • LORazepam (ATIVAN) 0.5 mg tablet Take 1 tablet (0.5 mg total) by mouth as needed for anxiety 30 tablet 0   • omeprazole (PriLOSEC) 20 mg delayed release capsule Take 1 capsule (20 mg total) by mouth daily before breakfast 90 capsule 3     No current facility-administered medications for this visit.     No Known Allergies   Immunizations:     Immunization History   Administered Date(s) Administered   • COVID-19 MODERNA VACC 0.5 ML IM 04/05/2021, 05/06/2021, 01/19/2022   • Pneumococcal Conjugate Vaccine 20-valent (Pcv20), Polysace 01/23/2024      Health Maintenance:         Topic Date Due   • Colorectal Cancer Screening  12/13/2023   • Breast Cancer Screening: Mammogram  10/09/2025   • Cervical Cancer Screening  10/20/2025   • Hepatitis C Screening  Completed         Topic Date Due   • Influenza Vaccine (1) Never done   • COVID-19 Vaccine (4 - 2023-24 season) 09/01/2023      Medicare Screening Tests and Risk Assessments:     Yudi is here for her Subsequent Wellness visit.     Health Risk Assessment:   Patient rates overall health as very good. Patient feels that their physical health rating is slightly better. Patient is very satisfied with their life. Eyesight was rated as slightly worse. Hearing was rated as same. Patient feels that their emotional and mental health rating is same. Patients states they are never, rarely angry. Patient states they are sometimes unusually tired/fatigued. Pain  experienced in the last 7 days has been some. Patient's pain rating has been 5/10. Patient states that she has experienced no weight loss or gain in last 6 months. Ankle pain    Depression Screening:   PHQ-2 Score: 0      Fall Risk Screening:   In the past year, patient has experienced: history of falling in past year    Number of falls: 1  Injured during fall?: Yes    Feels unsteady when standing or walking?: No    Worried about falling?: Yes      Urinary Incontinence Screening:   Patient has leaked urine accidently in the last six months.     Home Safety:  Patient does not have trouble with stairs inside or outside of their home. Patient has working smoke alarms and has working carbon monoxide detector.     Nutrition:   Current diet is Regular. Watches sweets    Medications:   Patient is currently taking over-the-counter supplements. OTC medications include: see medication list. Patient is able to manage medications.     Activities of Daily Living (ADLs)/Instrumental Activities of Daily Living (IADLs):   Walk and transfer into and out of bed and chair?: Yes  Dress and groom yourself?: Yes    Bathe or shower yourself?: Yes    Feed yourself? Yes  Do your laundry/housekeeping?: Yes  Manage your money, pay your bills and track your expenses?: Yes  Make your own meals?: Yes    Do your own shopping?: Yes    Previous Hospitalizations:   Any hospitalizations or ED visits within the last 12 months?: No      Advance Care Planning:   Living will: No    Durable POA for healthcare: No    Advanced directive counseling given: Yes    ACP document given: Yes      Cognitive Screening:   Provider or family/friend/caregiver concerned regarding cognition?: No    PREVENTIVE SCREENINGS      Cardiovascular Screening:    General: Screening Not Indicated, History Lipid Disorder and Screening Current      Diabetes Screening:     General: Screening Current      Colorectal Cancer Screening:     General: Screening Current      Breast Cancer  "Screening:     General: Screening Current      Cervical Cancer Screening:    General: Screening Not Indicated      Osteoporosis Screening:    General: Screening Not Indicated and History Osteoporosis      Abdominal Aortic Aneurysm (AAA) Screening:        General: Screening Not Indicated      Lung Cancer Screening:     General: Screening Not Indicated      Hepatitis C Screening:    General: Screening Current    Screening, Brief Intervention, and Referral to Treatment (SBIRT)    Screening      Single Item Drug Screening:  How often have you used an illegal drug (including marijuana) or a prescription medication for non-medical reasons in the past year? never    Single Item Drug Screen Score: 0  Interpretation: Negative screen for possible drug use disorder    No results found.     Physical Exam:     /74 (BP Location: Right arm, Patient Position: Sitting, Cuff Size: Standard)   Pulse 87   Ht 5' 2.5\" (1.588 m)   Wt 72.6 kg (160 lb)   SpO2 97%   BMI 28.80 kg/m²     Physical Exam  Vitals and nursing note reviewed.   Constitutional:       Appearance: Normal appearance. She is well-developed.   HENT:      Head: Normocephalic and atraumatic.   Eyes:      General: Lids are normal.      Conjunctiva/sclera: Conjunctivae normal.      Pupils: Pupils are equal, round, and reactive to light.   Cardiovascular:      Rate and Rhythm: Normal rate and regular rhythm.      Heart sounds: No murmur heard.  Pulmonary:      Effort: Pulmonary effort is normal.      Breath sounds: Normal breath sounds.   Skin:     General: Skin is warm and dry.   Neurological:      General: No focal deficit present.      Mental Status: She is alert.      Coordination: Coordination is intact.   Psychiatric:         Mood and Affect: Mood normal.         Behavior: Behavior normal. Behavior is cooperative.         Thought Content: Thought content normal.         Judgment: Judgment normal.          Nelly Newby PA-C  "

## 2024-01-23 NOTE — ASSESSMENT & PLAN NOTE
Lab Results   Component Value Date    EGFR 72 01/18/2024    EGFR 56 10/12/2022    EGFR 59 06/02/2021    CREATININE 0.84 01/18/2024    CREATININE 1.04 10/12/2022    CREATININE 1.02 06/02/2021   GFR has markedly improved from the 50s to the 70s with a normal BUN/creatinine.

## 2024-01-23 NOTE — ASSESSMENT & PLAN NOTE
Pt. had colonoscopy 12/2022 with poor pre and recommended repeat is 1 year. Pt does not want to go back at this time.

## 2024-01-23 NOTE — PATIENT INSTRUCTIONS
1. Primary hypertension  Assessment & Plan:  Blood pressure well-controlled on Zestril 10.      2. Gastroesophageal reflux disease, unspecified whether esophagitis present  Assessment & Plan:  Stable on PPI.    Orders:  -     omeprazole (PriLOSEC) 20 mg delayed release capsule; Take 1 capsule (20 mg total) by mouth daily before breakfast    3. Stage 3a chronic kidney disease (HCC)  Assessment & Plan:  Lab Results   Component Value Date    EGFR 72 01/18/2024    EGFR 56 10/12/2022    EGFR 59 06/02/2021    CREATININE 0.84 01/18/2024    CREATININE 1.04 10/12/2022    CREATININE 1.02 06/02/2021   GFR has markedly improved from the 50s to the 70s with a normal BUN/creatinine.      4. Vitamin D deficiency  Assessment & Plan:  Vitamin D was great at 79 continue supplementation.      5. Age-related osteoporosis without current pathological fracture  Assessment & Plan:  DEXA done in October 2022 showed a T-score of -2.8.  Patient opted for not using medications increasing weightbearing activity vitamin D calcium goal is 1200 mg a day which is usually 60 mg twice daily.  Recheck this year.      6. Hyperlipidemia, unspecified hyperlipidemia type  Assessment & Plan:  Patient's LDL is great at 84 HDL and triglycerides also great at 60 and 108 respectively.  Check yearly.      7. Polyp of colon, unspecified part of colon, unspecified type  Assessment & Plan:  Pt. had colonoscopy 12/2022 with poor pre and recommended repeat is 1 year. Pt does not want to go back at this time.

## 2024-01-23 NOTE — ASSESSMENT & PLAN NOTE
DEXA done in October 2022 showed a T-score of -2.8.  Patient opted for not using medications increasing weightbearing activity vitamin D calcium goal is 1200 mg a day which is usually 60 mg twice daily.  Recheck this year.

## 2024-01-23 NOTE — ASSESSMENT & PLAN NOTE
"With a history of bladder sling placed by Dr. Ragland in 2005.  She states that if she does not drink enough fluids her bladder \"acts up\".  I am not quite sure what she is speaking about but she does have an upcoming GYN evaluation with Dr. Rowe.  "

## 2024-01-25 ENCOUNTER — VBI (OUTPATIENT)
Dept: ADMINISTRATIVE | Facility: OTHER | Age: 69
End: 2024-01-25

## 2024-01-26 ENCOUNTER — ANNUAL EXAM (OUTPATIENT)
Dept: OBGYN CLINIC | Facility: CLINIC | Age: 69
End: 2024-01-26
Payer: COMMERCIAL

## 2024-01-26 VITALS
WEIGHT: 162 LBS | BODY MASS INDEX: 28.7 KG/M2 | HEIGHT: 63 IN | SYSTOLIC BLOOD PRESSURE: 132 MMHG | DIASTOLIC BLOOD PRESSURE: 78 MMHG

## 2024-01-26 DIAGNOSIS — Z12.31 VISIT FOR SCREENING MAMMOGRAM: ICD-10-CM

## 2024-01-26 DIAGNOSIS — N95.2 ATROPHIC VAGINITIS: ICD-10-CM

## 2024-01-26 DIAGNOSIS — Z01.419 ENCOUNTER FOR ANNUAL ROUTINE GYNECOLOGICAL EXAMINATION: Primary | ICD-10-CM

## 2024-01-26 DIAGNOSIS — Z72.3 INADEQUATE EXERCISE: ICD-10-CM

## 2024-01-26 DIAGNOSIS — Z12.11 COLON CANCER SCREENING: ICD-10-CM

## 2024-01-26 PROCEDURE — G0101 CA SCREEN;PELVIC/BREAST EXAM: HCPCS | Performed by: OBSTETRICS & GYNECOLOGY

## 2024-01-26 RX ORDER — ESTRADIOL 0.1 MG/G
0.5 CREAM VAGINAL 2 TIMES WEEKLY
Qty: 30 G | Refills: 0 | Status: SHIPPED | OUTPATIENT
Start: 2024-01-29

## 2024-01-26 RX ORDER — NICOTINE 14MG/24HR
PATCH, TRANSDERMAL 24 HOURS TRANSDERMAL
COMMUNITY

## 2024-01-26 NOTE — PROGRESS NOTES
Pt is a68 y.o.  ,menopausal, who presents for preventive care  Partner same ; lifetime  <5 partners         safe sexual practices followed: not currently active     does feel safe in relationship:yes    Dairy: 1 cheese 3-5x/week, 600 mg calcium supplement 1-2x/day  Vitamin D: in supplement  Exercise: walking 2-3x/week  Pap: 10/2022-wnl, HRHPV neg, no longer indicatd  Mammogram: 10/9/2023-wnl, repeat rx for 1 year given  Colonoscopy: 2022-poor prep, was due to repeat 2023--pt has yet to schedule.   DEXA: 10/7/2022-osteoporosis, managed by PCP  safety at home:  yes  tobacco use No  Pt notes urinary frequency when she is anxious only with minimal urine coming out. Relieved by intermittent ativan use. Unsure if related to menopause--will trial vaginal estrogen for 3 months and assess response.     Past Medical History:   Diagnosis Date    Deficiency of anterior cruciate ligament 2019    Diverticulosis     of colon. 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 Dr. Law    CARPECTOMY PROXIMAL ROW Right     COLONOSCOPY  2022    ONE YEAR RECOMMENDED = POOR PREP    DILATION AND CURETTAGE OF UTERUS  2007    MMR. Polyp benign. Dr. Lofton, hysteroscopy    INDUCED       Surgically induced. By dilation and curettage. 20 yo    OTHER SURGICAL HISTORY  2005    Anterior Colporrhaphy (for pelvic relaxation). Dr. Cain    TUBAL LIGATION Bilateral     26 yo    URETHRAL SLING  2005    Mid. TOT. Dr. Cain (c ant colporr, Prolift, extraperitoneal colpopexy    UTERINE SUSPENSION  2005     Prolift, mesh. Dr. Cain       Ob Hx:   OB History    Para Term  AB Living   2 2 2     2   SAB IAB Ectopic Multiple Live Births                  # Outcome Date GA Lbr Castro/2nd Weight Sex Delivery Anes PTL Lv   2 Term            1 Term               Obstetric Comments   Menarche: 15      Menopause: 56           Current Outpatient Medications:     acetaminophen (TYLENOL) 500 mg tablet, Take 500 mg by mouth every 6 (six) hours as needed, Disp: , Rfl:     cholecalciferol (VITAMIN D3) 1,000 units tablet, Take 1 tablet (1,000 Units total) by mouth daily, Disp: 30 tablet, Rfl: 0    diphenhydrAMINE-acetaminophen (TYLENOL PM)  MG TABS, Take by mouth, Disp: , Rfl:     [START ON 2024] estradiol (ESTRACE VAGINAL) 0.1 mg/g vaginal cream, Insert 0.5 g into the vagina 2 (two) times a week, Disp: 30 g, Rfl: 0    fluocinolone (SYNALAR) 0.01 % external solution, APPLY TO AFFECTED AREA OF SCALP NIGHTLY FOR 2 WEEKS AND THEN DECREASE TO 2-3 TIMES A WEEK FOR MAINTENANCE. AVOID FACE, Disp: , Rfl:     hydrocortisone 2.5 % cream, MIX PEA SZIED DOSE WITH KETOCONAZOLE CREAM AND APPLY TO AFFECTED AREA TWICE A DAY FOR 2 WEEKS THEN 1-2 TIMES PER WEEK AS NEEDED, Disp: , Rfl:     ketoconazole (NIZORAL) 2 % cream, APPLY TOPICALLY TO AFFECTED AREA ON FACE, EARS TWICE A DAY AS DIRECTED, Disp: , Rfl:     lisinopril (ZESTRIL) 10 mg tablet, TAKE ONE TABLET BY MOUTH EVERY DAY, Disp: 90 tablet, Rfl: 0    LORazepam (ATIVAN) 0.5 mg tablet, Take 1 tablet (0.5 mg total) by mouth as needed for anxiety, Disp: 30 tablet, Rfl: 0    omeprazole (PriLOSEC) 20 mg delayed release capsule, Take 1 capsule (20 mg total) by mouth daily before breakfast, Disp: 90 capsule, Rfl: 3    Saccharomyces boulardii (Probiotic) 250 MG CAPS, Take by mouth, Disp: , Rfl:     No Known Allergies    Social History     Socioeconomic History    Marital status: /Civil Union     Spouse name: Shemar    Number of children: 2    Years of education: None     Highest education level: High school graduate   Occupational History    Occupation: Customer Service--Retired     Comment: [employer]   Tobacco Use    Smoking status: Former     Current packs/day: 0.00     Average packs/day: 0.3 packs/day for 5.0 years (1.3 ttl pk-yrs)     Types: Cigarettes     Start date: 1972     Quit date: 1977     Years since quittin.0    Smokeless tobacco: Never    Tobacco comments:     Secondhand smoke exposure.  smoked in house until .  41 yrs 2017   Vaping Use    Vaping status: Never Used   Substance and Sexual Activity    Alcohol use: Yes     Comment: seldom, social drinks if any    Drug use: No    Sexual activity: Not Currently     Partners: Male     Birth control/protection: Female Sterilization     Comment: lifetime partners: 3;     Other Topics Concern    None   Social History Narrative    Buddhist: Episcopal    Accepts blood products            Exercise: walking 2-3x/week    Calcium: 1 cheese 3-5x/week, 600 mg calcium supplement 1-2x/day     Social Determinants of Health     Financial Resource Strain: Low Risk  (2024)    Overall Financial Resource Strain (CARDIA)     Difficulty of Paying Living Expenses: Not very hard   Food Insecurity: Not on file   Transportation Needs: No Transportation Needs (2024)    PRAPARE - Transportation     Lack of Transportation (Medical): No     Lack of Transportation (Non-Medical): No   Physical Activity: Not on file   Stress: Not on file   Social Connections: Not on file   Intimate Partner Violence: Not on file   Housing Stability: Not on file       Family History   Problem Relation Age of Onset    Hypertension Mother 80    Dementia Father         due tp a series of strokes    Hypertension Father     Stroke Father     Hydrocephalus Father         had shunt    No Known Problems Sister     No Known Problems Sister     Alcohol abuse Brother     Drug abuse Brother     Hypertension Brother     Breast  "cancer Maternal Grandmother         MA x2    Dementia Maternal Grandmother         Several a, u's    Deep vein thrombosis Maternal Grandmother         Pregnancy related    COPD Maternal Grandfather     Osteoporosis Paternal Grandmother     Emphysema Paternal Grandfather     Heart disease Paternal Grandfather         Ischemic    Migraines Daughter     Migraines Son     Breast cancer Maternal Aunt         MA x2    Dementia Paternal Aunt         Several a, u's    Dementia Paternal Uncle         Several a, u's    Colon cancer Cousin     Diabetes type I Other         Mellitus    Lung disease Family     Ovarian cancer Neg Hx        Blood pressure 132/78, height 5' 2.5\" (1.588 m), weight 73.5 kg (162 lb), not currently breastfeeding. and Body mass index is 29.16 kg/m².    Physical Exam  Constitutional:       General: She is not in acute distress.     Appearance: Normal appearance. She is well-developed. She is not ill-appearing.   HENT:      Head: Normocephalic and atraumatic.   Eyes:      Extraocular Movements: Extraocular movements intact.      Conjunctiva/sclera: Conjunctivae normal.   Neck:      Thyroid: No thyromegaly.      Trachea: No tracheal deviation.   Cardiovascular:      Rate and Rhythm: Normal rate and regular rhythm.      Heart sounds: Normal heart sounds.   Pulmonary:      Effort: Pulmonary effort is normal. No respiratory distress.      Breath sounds: Normal breath sounds. No stridor. No wheezing or rales.   Abdominal:      General: Bowel sounds are normal. There is no distension.      Palpations: Abdomen is soft. There is no mass.      Tenderness: There is no abdominal tenderness. There is no guarding or rebound.      Hernia: No hernia is present.   Musculoskeletal:         General: No tenderness. Normal range of motion.      Cervical back: Normal range of motion and neck supple.   Lymphadenopathy:      Cervical: No cervical adenopathy.   Skin:     General: Skin is warm.      Findings: No erythema or rash. "   Neurological:      Mental Status: She is alert and oriented to person, place, and time.   Psychiatric:         Mood and Affect: Mood normal.         Behavior: Behavior normal.         Thought Content: Thought content normal.         Judgment: Judgment normal.          Breasts: breasts appear normal, no suspicious masses, no skin or nipple changes or axillary nodes, symmetric fibrous changes in both upper outer quadrants.    vulva: normal external genitalia for age and no lesions, masses, epithelial changes, or exudate  vagina: color pale and rugae  flattening of rugae  cervix: parous and no lesions   uterus: NSSC, AF, NT, mobile  adnexa: no masses or tenderness  rectum: no masses or nodularity    A/P:  Pt is a 68 y.o.  with       Yudi was seen today for gynecologic exam.    Diagnoses and all orders for this visit:    Encounter for annual routine gynecological examination  -stable examination  -pap not indicated  -dexa up to date    Visit for screening mammogram  -     Mammo screening bilateral w 3d & cad; Future    Atrophic vaginitis  -     estradiol (ESTRACE VAGINAL) 0.1 mg/g vaginal cream; Insert 0.5 g into the vagina 2 (two) times a week    Colon cancer screening  -pt due secondary to poor prep. Reports she will call her physician to schedule    Inadequate exercise  Patient advised recommendation of exercise 5 times per week for 30 minutes.    BMI 29.0-29.9,adult  Patient advised recommendation of BMI to be between 19-25.

## 2024-02-18 ENCOUNTER — OFFICE VISIT (OUTPATIENT)
Dept: URGENT CARE | Facility: MEDICAL CENTER | Age: 69
End: 2024-02-18
Payer: COMMERCIAL

## 2024-02-18 ENCOUNTER — APPOINTMENT (OUTPATIENT)
Dept: RADIOLOGY | Facility: MEDICAL CENTER | Age: 69
End: 2024-02-18
Payer: COMMERCIAL

## 2024-02-18 VITALS
TEMPERATURE: 98.1 F | HEART RATE: 90 BPM | OXYGEN SATURATION: 98 % | SYSTOLIC BLOOD PRESSURE: 131 MMHG | DIASTOLIC BLOOD PRESSURE: 65 MMHG | RESPIRATION RATE: 18 BRPM
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
## 2024-02-18 DIAGNOSIS — T50.905A ADVERSE EFFECT OF DRUG, INITIAL ENCOUNTER: ICD-10-CM

## 2024-02-18 DIAGNOSIS — S52.502A CLOSED TRAUMATIC DISPLACED FRACTURE OF DISTAL END OF LEFT RADIUS, INITIAL ENCOUNTER: ICD-10-CM
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
## 2024-02-18 PROCEDURE — 99213 OFFICE O/P EST LOW 20 MIN: CPT | Performed by: PHYSICIAN ASSISTANT

## 2024-02-18 PROCEDURE — 29125 APPL SHORT ARM SPLINT STATIC: CPT | Performed by: PHYSICIAN ASSISTANT

## 2024-02-18 PROCEDURE — 73110 X-RAY EXAM OF WRIST: CPT

## 2024-02-18 RX ORDER — NALOXONE HYDROCHLORIDE 4 MG/.1ML
SPRAY NASAL
Qty: 1 EACH | Refills: 1 | Status: SHIPPED | OUTPATIENT
Start: 2024-02-18 | End: 2025-02-17

## 2024-02-18 RX ORDER — KETOROLAC TROMETHAMINE 30 MG/ML
15 INJECTION, SOLUTION INTRAMUSCULAR; INTRAVENOUS ONCE
Status: COMPLETED | OUTPATIENT
Start: 2024-02-18 | End: 2024-02-18

## 2024-02-18 RX ORDER — HYDROCODONE BITARTRATE AND ACETAMINOPHEN 5; 325 MG/1; MG/1
1 TABLET ORAL EVERY 4 HOURS PRN
Qty: 12 TABLET | Refills: 0 | Status: SHIPPED | OUTPATIENT
Start: 2024-02-18

## 2024-02-18 RX ADMIN — KETOROLAC TROMETHAMINE 15 MG: 30 INJECTION, SOLUTION INTRAMUSCULAR; INTRAVENOUS at 13:22

## 2024-02-18 NOTE — PROGRESS NOTES
[facility]    NAME: Yudi Barnhart is a 68 y.o. female  : 1955    MRN: [id number]  DATE: 2024  TIME: 2:08 PM    Assessment and Plan   Fall, initial encounter [W19.XXXA]  1. Fall, initial encounter  XR wrist 3+ vw left      2. Closed traumatic displaced fracture of distal end of left radius, initial encounter  ketorolac (TORADOL) injection 15 mg    Ambulatory Referral to Orthopedic Surgery    HYDROcodone-acetaminophen (Norco) 5-325 mg per tablet    naloxone (NARCAN) 4 mg/0.1 mL nasal spray    Orthopedic injury treatment      3. Adverse effect of drug, initial encounter  naloxone (NARCAN) 4 mg/0.1 mL nasal spray        Rings (2) were removed from pt's LRF by spouse with CN .  Spouse pocketed rings to take home.      X-ray left wrist: Displaced fracture distal radius with dorsal angulation.  Await radiologist final report.     Spouse asked if we could reduce it here.  Provider indicated that we do not do that procedure here.  They could go to the emergency room.  I informed that I will contact on-call Ortho and discuss current needs.  Spouse and patient expressed understanding.    Tiger text with on-call Ortho, Santino Min, orthopedic surgeon on call.  First recommended hematoma block and reduction.  Told we do not provide that level of service here.  Pt neurovascularly intact.  Instructed on applying sugar-tong splint and agreeable to stat referral to  Ortho with ER if worsening pain or neurologic changes.    Nurse administered Toradol 15 mg IM.    Sling from [employer] applied to patient's left arm.  Paperwork for [employer] completed.    Pt instructed not to take lorazepam while using Hydrocodone.  Pt reports that she rarely takes that.            Patient Instructions     Patient Instructions   Sugar-tong splint to stabilize and protect.  Sling to help support.    Elevate arm is much as possible to control pain and swelling.  Cold compresses over splint/wrist 15-20 minutes every  1-2 hours while awake as needed for pain control.    Narcotic pain medication for comfort as needed.    Referral placed for [facility] orthopedist.  Please call first thing in morning to make appointment.    Orthopedic scheduling office at [phone] to inquire about scheduling an appointment.    If significant worsening of pain, loss of sensation of hand proceed immediately to emergency room for further evaluation.    Chief Complaint     Chief Complaint   Patient presents with    Fall     Patient c/o left wrist pain after she fell on ice.       History of Present Illness   Yudi Barnhart presents to the clinic c/o  68-year-old female comes in with complaint of left wrist pain.    Started: Was entire lizett of snow and ice and slipped and fell backwards reaching out her left arm to catch herself causing injury to left wrist.  Associated signs and symptoms: Pain, swelling, crying.  Modifying factors: Came straight here for further evaluation.    Prior injury to left wrist when she was much younger.    Prior x-ray 2019 post carpectomy left wrist.  That was done at [facility].    Hand dominance: Right.        Review of Systems   Review of Systems   Constitutional:  Positive for activity change. Negative for chills and fever.   Musculoskeletal:  Positive for arthralgias and joint swelling.       Current Medications     Long-Term Medications   Medication Sig Dispense Refill    naloxone (NARCAN) 4 mg/0.1 mL nasal spray Administer 1 spray into a nostril. If no response after 2-3 minutes, give another dose in the other nostril using a new spray. 1 each 1    cholecalciferol (VITAMIN D3) 1,000 units tablet Take 1 tablet (1,000 Units total) by mouth daily 30 tablet 0    diphenhydrAMINE-acetaminophen (TYLENOL PM)  MG TABS Take by mouth      estradiol (ESTRACE VAGINAL) 0.1 mg/g vaginal cream Insert 0.5 g into the vagina 2 (two) times a week 30 g 0    fluocinolone (SYNALAR) 0.01 % external solution APPLY TO AFFECTED AREA  OF SCALP NIGHTLY FOR 2 WEEKS AND THEN DECREASE TO 2-3 TIMES A WEEK FOR MAINTENANCE. AVOID FACE      hydrocortisone 2.5 % cream MIX PEA SZIED DOSE WITH KETOCONAZOLE CREAM AND APPLY TO AFFECTED AREA TWICE A DAY FOR 2 WEEKS THEN 1-2 TIMES PER WEEK AS NEEDED      ketoconazole (NIZORAL) 2 % cream APPLY TOPICALLY TO AFFECTED AREA ON FACE, EARS TWICE A DAY AS DIRECTED      lisinopril (ZESTRIL) 10 mg tablet TAKE ONE TABLET BY MOUTH EVERY DAY 90 tablet 0    LORazepam (ATIVAN) 0.5 mg tablet Take 1 tablet (0.5 mg total) by mouth as needed for anxiety 30 tablet 0    omeprazole (PriLOSEC) 20 mg delayed release capsule Take 1 capsule (20 mg total) by mouth daily before breakfast 90 capsule 3       Current Allergies     Allergies as of 2024    (No Known Allergies)          The following portions of the patient's history were reviewed and updated as appropriate: allergies, current medications, past family history, past medical history, past social history, past surgical history and problem list.  Past Medical History:   Diagnosis Date    Deficiency of anterior cruciate ligament 2019    Diverticulosis     of colon. 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 Dr. Law    CARPECTOMY PROXIMAL ROW Right     COLONOSCOPY  2022    ONE YEAR RECOMMENDED = POOR PREP    DILATION AND CURETTAGE OF UTERUS  2007    MMR. Polyp benign. Dr. Lofton, hysteroscopy    INDUCED       Surgically induced. By dilation and curettage. 20 yo    OTHER SURGICAL HISTORY  2005    Anterior Colporrhaphy (for pelvic relaxation).   [place]    TUBAL LIGATION Bilateral     28 yo    URETHRAL SLING  05/2005    Mid. TOT. Dr. Cain (c ant colporr, Prolift, extraperitoneal colpopexy    UTERINE SUSPENSION  05/2005    Prolift, mesh. Dr. Cain     Family History   Problem Relation Age of Onset    Hypertension Mother 80    Dementia Father         due tp a series of strokes    Hypertension Father     Stroke Father     Hydrocephalus Father         had shunt    No Known Problems Sister     No Known Problems Sister     Alcohol abuse Brother     Drug abuse Brother     Hypertension Brother     Breast cancer Maternal Grandmother         MA x2    Dementia Maternal Grandmother         Several a, u's    Deep vein thrombosis Maternal Grandmother         Pregnancy related    COPD Maternal Grandfather     Osteoporosis Paternal Grandmother     Emphysema Paternal Grandfather     Heart disease Paternal Grandfather         Ischemic    Migraines Daughter     Migraines Son     Breast cancer Maternal Aunt         MA x2    Dementia Paternal Aunt         Several a, u's    Dementia Paternal Uncle         Several a, u's    Colon cancer Cousin     Diabetes type I Other         Mellitus    Lung disease Family     Ovarian cancer Neg Hx        Objective   /65   Pulse 90   Temp 98.1 °F (36.7 °C)   Resp 18   SpO2 98%   No LMP recorded. Patient is postmenopausal.       Physical Exam     Physical Exam  Vitals and nursing note reviewed.   Constitutional:       General: She is in acute distress.      Appearance: She is well-developed. She is not ill-appearing, toxic-appearing or diaphoretic.      Comments: Patient appears to be in considerable pain.  She is accompanied by her spouse.   Cardiovascular:      Rate and Rhythm: Normal rate.   Pulmonary:      Effort: Pulmonary effort is normal. No respiratory distress.   Musculoskeletal:         General: Swelling, tenderness, deformity and signs of injury present.      Left wrist: Swelling, deformity, tenderness and bony  "tenderness present. Decreased range of motion.      Comments: Neurovascular intact distally.  Skin intact.  Rings removed from left ring finger by spouse.  Slight abrasion of skin from wire cutters.  Wound cleaned and bandage applied.   Skin:     General: Skin is warm and dry.      Comments: Rings noted on left ring finger.   Neurological:      Mental Status: She is alert and oriented to person, place, and time.   Psychiatric:         Mood and Affect: Mood normal.         Behavior: Behavior normal.       Orthopedic injury treatment    Date/Time: 2/18/2024 12:45 PM    Performed by: Radha Montesinos PA-C  Authorized by: Radha Montesinos PA-C    Patient Location:  [facility] Protocol:  Procedure performed by: (provider and nurse applied sugar tong orthoglass splint)  Consent given by: patient  Patient understanding: patient states understanding of the procedure being performed  Patient identity confirmed: verbally with patient    Neurovascular status: Neurovascularly intact    Distal perfusion: normal    Neurological function: normal    Range of motion: reduced    Immobilization:  Splint  Splint type:  Sugar tong  Supplies used:  Elastic bandage and Ortho-Glass (4, 3\" ace wraps to secure sugar tong)  Neurovascular status: Neurovascularly intact    Distal perfusion: normal    Neurological function: normal    Range of motion: unchanged    Patient tolerance:  Patient tolerated the procedure well with no immediate complications   Pt's L. Arm placed in sling after sugar tong.            "

## 2024-02-18 NOTE — PATIENT INSTRUCTIONS
Sugar-tong splint to stabilize and protect.  Sling to help support.    Elevate arm is much as possible to control pain and swelling.  Cold compresses over splint/wrist 15-20 minutes every 1-2 hours while awake as needed for pain control.    Narcotic pain medication for comfort as needed.    Referral placed for  [facility] orthopedist.  Please call first thing in morning to make appointment.    Orthopedic scheduling office at [phone] to inquire about scheduling an appointment.    If significant worsening of pain, loss of sensation of hand proceed immediately to emergency room for further evaluation.

## 2024-02-19 ENCOUNTER — APPOINTMENT (OUTPATIENT)
Dept: RADIOLOGY | Facility: CLINIC | Age: 69
End: 2024-02-19
Payer: COMMERCIAL

## 2024-02-19 ENCOUNTER — OFFICE VISIT (OUTPATIENT)
Dept: OBGYN CLINIC | Facility: CLINIC | Age: 69
End: 2024-02-19
Payer: COMMERCIAL

## 2024-02-19 VITALS
HEIGHT: 63 IN | WEIGHT: 162 LBS | HEART RATE: 81 BPM | BODY MASS INDEX: 28.7 KG/M2 | DIASTOLIC BLOOD PRESSURE: 82 MMHG | SYSTOLIC BLOOD PRESSURE: 129 MMHG

## 2024-02-19 DIAGNOSIS — S52.502A CLOSED TRAUMATIC DISPLACED FRACTURE OF DISTAL END OF LEFT RADIUS, INITIAL ENCOUNTER: ICD-10-CM

## 2024-02-19 PROCEDURE — 99203 OFFICE O/P NEW LOW 30 MIN: CPT | Performed by: ORTHOPAEDIC SURGERY

## 2024-02-19 PROCEDURE — 73110 X-RAY EXAM OF WRIST: CPT

## 2024-02-19 PROCEDURE — 25605 CLTX DST RDL FX/EPHYS SEP W/: CPT | Performed by: ORTHOPAEDIC SURGERY

## 2024-02-19 NOTE — PROGRESS NOTES
Assessment/Plan:  1. Closed traumatic displaced fracture of distal end of left radius, initial encounter  Ambulatory Referral to Orthopedic Surgery    XR wrist 3+ vw left          Subjective history, physical examination performed, diagnostic imaging reviewed at today's visit  Diagnosis was discussed  Treatment options were discussed which included nonoperative and operative treatment.    Risks, benefits, and realistic expectations for treatment options were discussed.    The patient was given an opportunity to ask questions.  Questions were answered to the patient's satisfaction.  Through shared decision making, the patient decided to move forward with closed reduction in the office with casting.  Patient tolerated the procedure well.  New x-rays were obtained post reduction which demonstrated better alignment of the distal radius fracture.  It was discussed with the patient that she will require close follow-up as there is always the possibility that this could fall out of position which may require surgical intervention.  She will follow-up in 1 week with Dr. Reynoso for x-rays in the cast.  If the cast seems loose, the cast can be replaced.          cc: Left wrist pain    Subjective:   Yudi Barnhart is a right hand dominant 68 y.o. female who presents left wrist pain.  Patient states that she slipped and fell on ice landing on the outstretched hand.  This occurred on 2/18/2024.  She went to urgent care and had x-rays which demonstrated distal radius fracture on the left side.  She was placed into a sugar-tong splint and instructed to follow-up with orthopedics.  She states that she has pain into her hand.  She denies any numbness or tingling into the digits.      Review of Systems  General: no fever, no chills  HEENT: No loss of hearing or eyesight problems  Eyes: No red eyes  Respiratory: No coughing, shortness of breath or wheezing  Cardiovascular: No chest pain, no palpitations  GI: Abdomen soft nontender,  denies nausea  Endocrine: No muscle weakness, no frequent urination, no excessive thirst  Urinary: No dysuria, no incontinence  Musculoskeletal: see HPI and PE  SKIN: No skin rash, no dry skin  Neurological: No headaches, no confusion  Psychiatric: No suicide thoughts, no anxiety, no depression  Review of all other systems is negative    Past Medical History:   Diagnosis Date    Deficiency of anterior cruciate ligament 2019    Diverticulosis     of colon. 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 Dr. Law    CARPECTOMY PROXIMAL ROW Right     COLONOSCOPY  2022    ONE YEAR RECOMMENDED = POOR PREP    DILATION AND CURETTAGE OF UTERUS  2007    MMR. Polyp benign. Dr. Lofton, hysteroscopy    INDUCED       Surgically induced. By dilation and curettage. 22 yo    OTHER SURGICAL HISTORY  2005    Anterior Colporrhaphy (for pelvic relaxation). Dr. Cain    TUBAL LIGATION Bilateral     26 yo    URETHRAL SLING  2005    Mid. TOT. Dr. Cain (c ant colporr, Prolift, extraperitoneal colpopexy    UTERINE SUSPENSION  2005    Prolift, mesh. Dr. Cain       Family History   Problem Relation Age of Onset    Hypertension Mother 80    Dementia Father         due tp a series of strokes    Hypertension Father     Stroke Father     Hydrocephalus Father         had shunt    No Known Problems Sister     No Known Problems Sister     Alcohol abuse Brother     Drug abuse Brother     Hypertension Brother     Breast cancer Maternal Grandmother         MA x2    Dementia Maternal Grandmother         Several a,  u's    Deep vein thrombosis Maternal Grandmother         Pregnancy related    COPD Maternal Grandfather     Osteoporosis Paternal Grandmother     Emphysema Paternal Grandfather     Heart disease Paternal Grandfather         Ischemic    Migraines Daughter     Migraines Son     Breast cancer Maternal Aunt         MA x2    Dementia Paternal Aunt         Several a, u's    Dementia Paternal Uncle         Several a, u's    Colon cancer Cousin     Diabetes type I Other         Mellitus    Lung disease Family     Ovarian cancer Neg Hx        Social History     Occupational History    Occupation: Customer Service--Retired     Comment: [employer]   Tobacco Use    Smoking status: Former     Current packs/day: 0.00     Average packs/day: 0.3 packs/day for 5.0 years (1.3 ttl pk-yrs)     Types: Cigarettes     Start date: 1972     Quit date: 1977     Years since quittin.1    Smokeless tobacco: Never    Tobacco comments:     Secondhand smoke exposure.  smoked in house until .  41 yrs 2017   Vaping Use    Vaping status: Never Used   Substance and Sexual Activity    Alcohol use: Yes     Comment: seldom, social drinks if any    Drug use: No    Sexual activity: Not Currently     Partners: Male     Birth control/protection: Female Sterilization     Comment: lifetime partners: 3;           Current Outpatient Medications:     acetaminophen (TYLENOL) 500 mg tablet, Take 500 mg by mouth every 6 (six) hours as needed, Disp: , Rfl:     cholecalciferol (VITAMIN D3) 1,000 units tablet, Take 1 tablet (1,000 Units total) by mouth daily, Disp: 30 tablet, Rfl: 0    diphenhydrAMINE-acetaminophen (TYLENOL PM)  MG TABS, Take by mouth, Disp: , Rfl:     estradiol (ESTRACE VAGINAL) 0.1 mg/g vaginal cream, Insert 0.5 g into the vagina 2 (two) times a week, Disp: 30 g, Rfl: 0    fluocinolone (SYNALAR) 0.01 % external solution, APPLY TO AFFECTED AREA OF SCALP NIGHTLY FOR 2 WEEKS AND THEN DECREASE TO 2-3  TIMES A WEEK FOR MAINTENANCE. AVOID FACE, Disp: , Rfl:     HYDROcodone-acetaminophen (Norco) 5-325 mg per tablet, Take 1 tablet by mouth every 4 (four) hours as needed for pain for up to 12 doses Max Daily Amount: 6 tablets, Disp: 12 tablet, Rfl: 0    hydrocortisone 2.5 % cream, MIX PEA SZIED DOSE WITH KETOCONAZOLE CREAM AND APPLY TO AFFECTED AREA TWICE A DAY FOR 2 WEEKS THEN 1-2 TIMES PER WEEK AS NEEDED, Disp: , Rfl:     ketoconazole (NIZORAL) 2 % cream, APPLY TOPICALLY TO AFFECTED AREA ON FACE, EARS TWICE A DAY AS DIRECTED, Disp: , Rfl:     lisinopril (ZESTRIL) 10 mg tablet, TAKE ONE TABLET BY MOUTH EVERY DAY, Disp: 90 tablet, Rfl: 0    LORazepam (ATIVAN) 0.5 mg tablet, Take 1 tablet (0.5 mg total) by mouth as needed for anxiety, Disp: 30 tablet, Rfl: 0    naloxone (NARCAN) 4 mg/0.1 mL nasal spray, Administer 1 spray into a nostril. If no response after 2-3 minutes, give another dose in the other nostril using a new spray., Disp: 1 each, Rfl: 1    omeprazole (PriLOSEC) 20 mg delayed release capsule, Take 1 capsule (20 mg total) by mouth daily before breakfast, Disp: 90 capsule, Rfl: 3    Saccharomyces boulardii (Probiotic) 250 MG CAPS, Take by mouth, Disp: , Rfl:     No Known Allergies    Objective:  Vitals:    02/19/24 1419   BP: 129/82   Pulse: 81       The patient was awake, alert, and oriented to person, place, and time.  No acute distress.  Normocephalic.  EOMI.  Mucous membranes moist.  Normal respiratory effort.    Left wrist  Moderate swelling and ecchymosis noted over the volar aspect of the wrist.  No erythema noted  Tenderness to palpation over the fracture site  Patient is able to make a full composite fist  Sensation is intact to light touch  Capillary refill is less than 2 seconds    Imaging/Diagnostic Studies:    I reviewed imaging studies dated 2/18/2024 which included left wrist 3 views.  These images studies demonstrated dorsally displaced distal radius fracture    I reviewed the image studies  dated 2/19/2024 which include a left wrist 3 views.  These images demonstrated reduction of the distal radius fracture with slight radial translation.    Fracture / Dislocation Treatment    Date/Time: 2/19/2024 2:30 PM    Performed by: Alethea Avendano MD  Authorized by: Alethea Avendano MD    Patient Location:  [facility]  [place] Protocol:  Consent: Verbal consent obtained.  Risks and benefits: risks, benefits and alternatives were discussed  Consent given by: patient  Patient understanding: patient states understanding of the procedure being performed  Patient consent: the patient's understanding of the procedure matches consent given  Site marked: the operative site was marked  Patient identity confirmed: verbally with patient    Injury location:  Wrist  Location details:  Left wrist  Injury type:  Fracture  Fracture type: distal radius    Fracture type: distal radius    Neurovascular status: Neurovascularly intact    Distal perfusion: normal    Neurological function: normal    Range of motion: reduced    Local anesthesia used?: Yes    General anesthesia used?: No    Anesthesia:  Local infiltration  Local anesthetic:  Lidocaine 1% without epinephrine  Anesthetic total (ml):  10  Manipulation performed?: Yes    Skin traction used?: Yes    Skeletal traction used?: No    Reduction successful?: Yes    Confirmation: Reduction confirmed by x-ray    Immobilization:  Cast  Cast type:  Short arm  Supplies used:  Cotton padding and fiberglass  Neurovascular status: Neurovascularly intact    Distal perfusion: normal    Neurological function: normal    Range of motion: unchanged    Patient tolerance:  Patient tolerated the procedure well with no immediate complications  '      This document was created using speech voice recognition software.   Grammatical errors, random word insertions, pronoun errors, and incomplete sentences are an occasional consequence of this system due to software limitations, ambient  noise, and hardware issues.   Any formal questions or concerns about content, text, or information contained within the body of this dictation should be directly addressed to the provider for clarification.

## 2024-02-26 ENCOUNTER — APPOINTMENT (OUTPATIENT)
Dept: RADIOLOGY | Facility: CLINIC | Age: 69
End: 2024-02-26
Payer: COMMERCIAL

## 2024-02-26 ENCOUNTER — OFFICE VISIT (OUTPATIENT)
Dept: OBGYN CLINIC | Facility: CLINIC | Age: 69
End: 2024-02-26
Payer: COMMERCIAL

## 2024-02-26 VITALS
DIASTOLIC BLOOD PRESSURE: 80 MMHG | BODY MASS INDEX: 32.96 KG/M2 | HEIGHT: 63 IN | SYSTOLIC BLOOD PRESSURE: 150 MMHG | WEIGHT: 186 LBS

## 2024-02-26 DIAGNOSIS — Z09 FRACTURE FOLLOW-UP: ICD-10-CM

## 2024-02-26 DIAGNOSIS — Z09 FRACTURE FOLLOW-UP: Primary | ICD-10-CM

## 2024-02-26 PROCEDURE — 99214 OFFICE O/P EST MOD 30 MIN: CPT | Performed by: ORTHOPAEDIC SURGERY

## 2024-02-26 PROCEDURE — 73110 X-RAY EXAM OF WRIST: CPT

## 2024-02-26 NOTE — PROGRESS NOTES
ASSESSMENT/PLAN:    Assessment:   Left extra-articular distal radius fracture with reduction in the office    Plan:   X-ray was reviewed with the patient which demonstrated slight displacement since her postreduction film  It was discussed with the patient conservative as well as well as operative treatments  She would like to proceed with conservative treatment at this time with watchful waiting  She will follow-up in 1 week with Dr. Avendano with an x-ray of the left wrist in cast    Follow Up:  1  week(s)    To Do Next Visit:  X-ray in cast        _____________________________________________________  CHIEF COMPLAINT:  Chief Complaint   Patient presents with    Left Wrist - Fracture, Follow-up         SUBJECTIVE:  Yudi Barnhart is a 68 y.o. female who presents for 1 week follow-up regarding left displaced distal radius fracture with reduction in the office.  She has kept the cast clean, dry and intact.  She is here today for follow-up in regards to ensuring appropriate alignment of fracture.  She denies any numbness or tingling.    PAST MEDICAL HISTORY:  Past Medical History:   Diagnosis Date    Deficiency of anterior cruciate ligament 2019    Diverticulosis     of colon. 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 Dr. Law    CARPECTOMY PROXIMAL ROW Right     COLONOSCOPY  2022    ONE YEAR RECOMMENDED = POOR PREP    DILATION AND CURETTAGE OF UTERUS  2007    MMR. Polyp benign. Dr. Lofton, hysteroscopy    INDUCED       Surgically induced. By dilation and curettage.  20 yo    OTHER SURGICAL HISTORY  2005    Anterior Colporrhaphy (for pelvic relaxation). Dr. Cain    TUBAL LIGATION Bilateral     28 yo    URETHRAL SLING  2005    Mid. TOT. Dr. Cain (c ant colporr, Prolift, extraperitoneal colpopexy    UTERINE SUSPENSION  2005    Prolift, mesh. Dr. Cain       FAMILY HISTORY:  Family History   Problem Relation Age of Onset    Hypertension Mother 80    Dementia Father         due tp a series of strokes    Hypertension Father     Stroke Father     Hydrocephalus Father         had shunt    No Known Problems Sister     No Known Problems Sister     Alcohol abuse Brother     Drug abuse Brother     Hypertension Brother     Breast cancer Maternal Grandmother         MA x2    Dementia Maternal Grandmother         Several a, u's    Deep vein thrombosis Maternal Grandmother         Pregnancy related    COPD Maternal Grandfather     Osteoporosis Paternal Grandmother     Emphysema Paternal Grandfather     Heart disease Paternal Grandfather         Ischemic    Migraines Daughter     Migraines Son     Breast cancer Maternal Aunt         MA x2    Dementia Paternal Aunt         Several a, u's    Dementia Paternal Uncle         Several a, u's    Colon cancer Cousin     Diabetes type I Other         Mellitus    Lung disease Family     Ovarian cancer Neg Hx        SOCIAL HISTORY:  Social History     Tobacco Use    Smoking status: Former     Current packs/day: 0.00     Average packs/day: 0.3 packs/day for 5.0 years (1.3 ttl pk-yrs)     Types: Cigarettes     Start date: 1972     Quit date: 1977     Years since quittin.1    Smokeless tobacco: Never    Tobacco comments:     Secondhand smoke exposure.  smoked in house until .  41 yrs 2017   Vaping Use    Vaping status: Never Used   Substance Use Topics    Alcohol use: Yes     Comment: seldom, social drinks if any    Drug use: No       MEDICATIONS:    Current Outpatient Medications:     acetaminophen (TYLENOL)  "500 mg tablet, Take 500 mg by mouth every 6 (six) hours as needed, Disp: , Rfl:     cholecalciferol (VITAMIN D3) 1,000 units tablet, Take 1 tablet (1,000 Units total) by mouth daily, Disp: 30 tablet, Rfl: 0    diphenhydrAMINE-acetaminophen (TYLENOL PM)  MG TABS, Take by mouth, Disp: , Rfl:     estradiol (ESTRACE VAGINAL) 0.1 mg/g vaginal cream, Insert 0.5 g into the vagina 2 (two) times a week, Disp: 30 g, Rfl: 0    fluocinolone (SYNALAR) 0.01 % external solution, APPLY TO AFFECTED AREA OF SCALP NIGHTLY FOR 2 WEEKS AND THEN DECREASE TO 2-3 TIMES A WEEK FOR MAINTENANCE. AVOID FACE, Disp: , Rfl:     HYDROcodone-acetaminophen (Norco) 5-325 mg per tablet, Take 1 tablet by mouth every 4 (four) hours as needed for pain for up to 12 doses Max Daily Amount: 6 tablets, Disp: 12 tablet, Rfl: 0    hydrocortisone 2.5 % cream, MIX PEA SZIED DOSE WITH KETOCONAZOLE CREAM AND APPLY TO AFFECTED AREA TWICE A DAY FOR 2 WEEKS THEN 1-2 TIMES PER WEEK AS NEEDED, Disp: , Rfl:     ketoconazole (NIZORAL) 2 % cream, APPLY TOPICALLY TO AFFECTED AREA ON FACE, EARS TWICE A DAY AS DIRECTED, Disp: , Rfl:     lisinopril (ZESTRIL) 10 mg tablet, TAKE ONE TABLET BY MOUTH EVERY DAY, Disp: 90 tablet, Rfl: 0    LORazepam (ATIVAN) 0.5 mg tablet, Take 1 tablet (0.5 mg total) by mouth as needed for anxiety, Disp: 30 tablet, Rfl: 0    naloxone (NARCAN) 4 mg/0.1 mL nasal spray, Administer 1 spray into a nostril. If no response after 2-3 minutes, give another dose in the other nostril using a new spray., Disp: 1 each, Rfl: 1    omeprazole (PriLOSEC) 20 mg delayed release capsule, Take 1 capsule (20 mg total) by mouth daily before breakfast, Disp: 90 capsule, Rfl: 3    Saccharomyces boulardii (Probiotic) 250 MG CAPS, Take by mouth, Disp: , Rfl:     ALLERGIES:  No Known Allergies    REVIEW OF SYSTEMS:  Pertinent items are noted in HPI.  A comprehensive review of systems was negative.    LABS:  HgA1c: No results found for: \"HGBA1C\"  BMP:   Lab Results " "  Component Value Date    GLUCOSE 103 03/10/2015    CALCIUM 9.2 01/18/2024     03/10/2015    K 4.3 01/18/2024    CO2 27 01/18/2024     01/18/2024    BUN 19 01/18/2024    CREATININE 0.84 01/18/2024       _____________________________________________________  PHYSICAL EXAMINATION:  Vital signs: /80   Ht 5' 2.5\" (1.588 m)   Wt 84.4 kg (186 lb)   BMI 33.48 kg/m²   General: well developed and well nourished, alert, oriented times 3, and appears comfortable  Psychiatric: Normal  HEENT: Trachea Midline, No torticollis  Cardiovascular: No discernable arrhythmia  Pulmonary: No wheezing or stridor  Abdomen: No rebound or guarding  Extremities: No peripheral edema  Skin: No masses, erythema, lacerations, fluctation, ulcerations  Neurovascular: Sensation Intact to the Median, Ulnar, Radial Nerve, Motor Intact to the Median, Ulnar, Radial Nerve, and Pulses Intact    MUSCULOSKELETAL EXAMINATION:  Left wrist  Cast is in appropriate position without evidence of damage  She is able to actively move all of the digits without discomfort  Sensation is intact to light touch  Capillary refill less than 2 seconds    _____________________________________________________  STUDIES REVIEWED:  Images were reviewed in PACS by Dr. Reynoso and demonstrate: X-rays of the left wrist demonstrated x-rays of the left wrist demonstrated slight increase displacement compared to postreduction film from 1 week ago.       PROCEDURES PERFORMED:  Procedures  No Procedures performed today    Scribe Attestation      I,:  Gagan Tirado PA-C am acting as a scribe while in the presence of the attending physician.:       I,:  Wale Reynoso MD personally performed the services described in this documentation    as scribed in my presence.:             "

## 2024-03-05 ENCOUNTER — OFFICE VISIT (OUTPATIENT)
Dept: OBGYN CLINIC | Facility: CLINIC | Age: 69
End: 2024-03-05

## 2024-03-05 ENCOUNTER — APPOINTMENT (OUTPATIENT)
Dept: RADIOLOGY | Facility: CLINIC | Age: 69
End: 2024-03-05
Payer: COMMERCIAL

## 2024-03-05 VITALS
HEIGHT: 63 IN | BODY MASS INDEX: 29.59 KG/M2 | SYSTOLIC BLOOD PRESSURE: 130 MMHG | DIASTOLIC BLOOD PRESSURE: 80 MMHG | WEIGHT: 167 LBS

## 2024-03-05 DIAGNOSIS — S52.502A CLOSED TRAUMATIC DISPLACED FRACTURE OF DISTAL END OF LEFT RADIUS, INITIAL ENCOUNTER: ICD-10-CM

## 2024-03-05 DIAGNOSIS — S52.502A CLOSED TRAUMATIC DISPLACED FRACTURE OF DISTAL END OF LEFT RADIUS, INITIAL ENCOUNTER: Primary | ICD-10-CM

## 2024-03-05 PROCEDURE — 99024 POSTOP FOLLOW-UP VISIT: CPT | Performed by: ORTHOPAEDIC SURGERY

## 2024-03-05 PROCEDURE — 73110 X-RAY EXAM OF WRIST: CPT

## 2024-03-05 RX ORDER — CALCIUM CARBONATE/VITAMIN D3 500MG-5MCG
500 TABLET ORAL DAILY
COMMUNITY

## 2024-03-05 NOTE — PROGRESS NOTES
Assessment/Plan:  1. Closed traumatic displaced fracture of distal end of left radius, initial encounter  XR wrist 3+ vw left          Subjective history, physical examination performed, diagnostic imaging reviewed at today's visit  Diagnosis was discussed  Treatment options were discussed which included nonoperative and operative treatment.    Risks, benefits, and realistic expectations for treatment options were discussed.    The patient was given an opportunity to ask questions.  Questions were answered to the patient's satisfaction.    Through shared decision making, the patient decided to move forward with  ***          cc: ***    Subjective:   Yudi Barnhart is a [place] [id number] 68 y.o. female who presents ***      Review of Systems      Past Medical History:   Diagnosis Date    Deficiency of anterior cruciate ligament 2019    Diverticulosis     of colon. 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 Dr. Law    CARPECTOMY PROXIMAL ROW Right     COLONOSCOPY  2022    ONE YEAR RECOMMENDED = POOR PREP    DILATION AND CURETTAGE OF UTERUS  2007    MMR. Polyp benign. Dr. Lofton, hysteroscopy    INDUCED       Surgically induced. By dilation and curettage. 20 yo    OTHER SURGICAL HISTORY  2005    Anterior Colporrhaphy (for pelvic relaxation). Dr. Cain    TUBAL LIGATION Bilateral     26 yo    URETHRAL SLING  2005    Mid. TOT. Dr. Cain (c ant colporr, Prolift, extraperitoneal colpopexy    UTERINE SUSPENSION  2005    Prolkia, mesh. Dr. Cain       Family History    Problem Relation Age of Onset    Hypertension Mother 80    Dementia Father         due tp a series of strokes    Hypertension Father     Stroke Father     Hydrocephalus Father         had shunt    No Known Problems Sister     No Known Problems Sister     Alcohol abuse Brother     Drug abuse Brother     Hypertension Brother     Breast cancer Maternal Grandmother         MA x2    Dementia Maternal Grandmother         Several a, u's    Deep vein thrombosis Maternal Grandmother         Pregnancy related    COPD Maternal Grandfather     Osteoporosis Paternal Grandmother     Emphysema Paternal Grandfather     Heart disease Paternal Grandfather         Ischemic    Migraines Daughter     Migraines Son     Breast cancer Maternal Aunt         MA x2    Dementia Paternal Aunt         Several a, u's    Dementia Paternal Uncle         Several a, u's    Colon cancer Cousin     Diabetes type I Other         Mellitus    Lung disease Family     Ovarian cancer Neg Hx        Social History     Occupational History    Occupation: Customer Service--Retired     Comment: [employer]   Tobacco Use    Smoking status: Former     Current packs/day: 0.00     Average packs/day: 0.3 packs/day for 5.0 years (1.3 ttl pk-yrs)     Types: Cigarettes     Start date: 1972     Quit date: 1977     Years since quittin.2    Smokeless tobacco: Never    Tobacco comments:     Secondhand smoke exposure.  smoked in house until .  41 yrs 2017   Vaping Use    Vaping status: Never Used   Substance and Sexual Activity    Alcohol use: Yes     Comment: seldom, social drinks if any    Drug use: No    Sexual activity: Not Currently     Partners: Male     Birth control/protection: Female Sterilization     Comment: lifetime partners: 3;           Current Outpatient Medications:     acetaminophen (TYLENOL) 500 mg tablet, Take 500 mg by mouth every 6 (six) hours as needed, Disp: , Rfl:     Calcium Carb-Cholecalciferol  (Calcium 500 + D) 500-5 MG-MCG TABS, Take 500 mg by mouth daily, Disp: , Rfl:     cholecalciferol (VITAMIN D3) 1,000 units tablet, Take 1 tablet (1,000 Units total) by mouth daily, Disp: 30 tablet, Rfl: 0    diphenhydrAMINE-acetaminophen (TYLENOL PM)  MG TABS, Take by mouth, Disp: , Rfl:     fluocinolone (SYNALAR) 0.01 % external solution, APPLY TO AFFECTED AREA OF SCALP NIGHTLY FOR 2 WEEKS AND THEN DECREASE TO 2-3 TIMES A WEEK FOR MAINTENANCE. AVOID FACE, Disp: , Rfl:     hydrocortisone 2.5 % cream, MIX PEA SZIED DOSE WITH KETOCONAZOLE CREAM AND APPLY TO AFFECTED AREA TWICE A DAY FOR 2 WEEKS THEN 1-2 TIMES PER WEEK AS NEEDED, Disp: , Rfl:     ketoconazole (NIZORAL) 2 % cream, APPLY TOPICALLY TO AFFECTED AREA ON FACE, EARS TWICE A DAY AS DIRECTED, Disp: , Rfl:     lisinopril (ZESTRIL) 10 mg tablet, TAKE ONE TABLET BY MOUTH EVERY DAY, Disp: 90 tablet, Rfl: 0    LORazepam (ATIVAN) 0.5 mg tablet, Take 1 tablet (0.5 mg total) by mouth as needed for anxiety, Disp: 30 tablet, Rfl: 0    omeprazole (PriLOSEC) 20 mg delayed release capsule, Take 1 capsule (20 mg total) by mouth daily before breakfast, Disp: 90 capsule, Rfl: 3    Saccharomyces boulardii (Probiotic) 250 MG CAPS, Take by mouth, Disp: , Rfl:     estradiol (ESTRACE VAGINAL) 0.1 mg/g vaginal cream, Insert 0.5 g into the vagina 2 (two) times a week, Disp: 30 g, Rfl: 0    HYDROcodone-acetaminophen (Norco) 5-325 mg per tablet, Take 1 tablet by mouth every 4 (four) hours as needed for pain for up to 12 doses Max Daily Amount: 6 tablets, Disp: 12 tablet, Rfl: 0    naloxone (NARCAN) 4 mg/0.1 mL nasal spray, Administer 1 spray into a nostril. If no response after 2-3 minutes, give another dose in the other nostril using a new spray., Disp: 1 each, Rfl: 1    No Known Allergies    Objective:  Vitals:    03/05/24 1505   BP: 130/80       The patient was awake, alert, and oriented to person, place, and time.  No acute distress.  Normocephalic.  EOMI.  Mucous membranes  moist.  Normal respiratory effort.    Examination of the affected extremity was compared to the unaffected extremity.  Skin was intact.  No swelling or ecchymosis.  No deformity.  Hand and fingers were warm and well-perfused.  Capillary refill was brisk.  Full active range of motion of the elbows, forearms, wrists, and fingers.  5/5 elbow flexors/extensors, wrist flexor/extensors, and .       Imaging/Diagnostic Studies:    I reviewed imaging studies dated *** which included *** .  These images studies demonstrated ***        This document was created using speech voice recognition software.   Grammatical errors, random word insertions, pronoun errors, and incomplete sentences are an occasional consequence of this system due to software limitations, ambient noise, and hardware issues.   Any formal questions or concerns about content, text, or information contained within the body of this dictation should be directly addressed to the provider for clarification.

## 2024-03-06 NOTE — PROGRESS NOTES
Assessment/Plan:  1. Closed traumatic displaced fracture of distal end of left radius, initial encounter  XR wrist 3+ vw left          I reviewed the x-rays with the patient today.  There has been no change in the position of the fracture when compared to x-rays obtained last week.  There had been a change in position when comparing the postreduction films to the films that were obtained last week.  Dr. Reynoso had also reviewed nonoperative versus operative management with the patient and the patient expressed interest in continuing with nonoperative management.    Treatment options were again discussed which included nonoperative and operative treatment.    Risks, benefits, and realistic expectations for treatment options were discussed.    The patient was given an opportunity to ask questions.  Questions were answered to the patient's satisfaction.    Through shared decision making, the patient decided to move forward with continued nonoperative management in a short arm cast.  Follow-up in 2 weeks for clinical and radiographic evaluation of the left wrist to include PA, lateral, and articular surface views out of the cast          cc: Follow-up for my wrist    Subjective:   Yudi Barnhart is a right hand dominant 68 y.o. female who presents for follow-up of a left distal radius fracture sustained on 2/18/2024.  At the initial office visit the following day, I performed a closed reduction and application of a short arm cast.  The patient has tolerated the cast well.  No complaints at today's office visit.        Objective:  Vitals:    03/05/24 1505   BP: 130/80       The patient was awake, alert, and oriented to person, place, and time.  No acute distress.  Normocephalic.  EOMI.  Mucous membranes moist.  Normal respiratory effort.    The cast is in good condition.  It fits well.  No areas of skin irritation around the cast.  The patient has good finger range of motion.      Imaging/Diagnostic Studies:    I reviewed  imaging studies obtained in the office today which included PA, lateral, and articular surface views.  When compared to the original postreduction films, there has been a loss of radial height and inclination.  There is slight dorsal translation of the distal fracture fragment which is unchanged when compared to original postreduction films.  Volar tilt remains neutral.      This document was created using speech voice recognition software.   Grammatical errors, random word insertions, pronoun errors, and incomplete sentences are an occasional consequence of this system due to software limitations, ambient noise, and hardware issues.   Any formal questions or concerns about content, text, or information contained within the body of this dictation should be directly addressed to the provider for clarification.

## 2024-03-19 ENCOUNTER — OFFICE VISIT (OUTPATIENT)
Dept: OCCUPATIONAL THERAPY | Facility: CLINIC | Age: 69
End: 2024-03-19
Payer: COMMERCIAL

## 2024-03-19 ENCOUNTER — OFFICE VISIT (OUTPATIENT)
Dept: OBGYN CLINIC | Facility: CLINIC | Age: 69
End: 2024-03-19
Payer: COMMERCIAL

## 2024-03-19 ENCOUNTER — APPOINTMENT (OUTPATIENT)
Dept: RADIOLOGY | Facility: CLINIC | Age: 69
End: 2024-03-19
Payer: COMMERCIAL

## 2024-03-19 VITALS
SYSTOLIC BLOOD PRESSURE: 159 MMHG | DIASTOLIC BLOOD PRESSURE: 90 MMHG | BODY MASS INDEX: 29.59 KG/M2 | HEIGHT: 63 IN | WEIGHT: 167 LBS

## 2024-03-19 DIAGNOSIS — S52.502A CLOSED TRAUMATIC DISPLACED FRACTURE OF DISTAL END OF LEFT RADIUS, INITIAL ENCOUNTER: ICD-10-CM

## 2024-03-19 DIAGNOSIS — S52.502A CLOSED TRAUMATIC DISPLACED FRACTURE OF DISTAL END OF LEFT RADIUS, INITIAL ENCOUNTER: Primary | ICD-10-CM

## 2024-03-19 PROCEDURE — 73110 X-RAY EXAM OF WRIST: CPT

## 2024-03-19 PROCEDURE — 99213 OFFICE O/P EST LOW 20 MIN: CPT | Performed by: ORTHOPAEDIC SURGERY

## 2024-03-19 PROCEDURE — L3906 WHO W/O JOINTS CF: HCPCS | Performed by: OCCUPATIONAL THERAPIST

## 2024-03-19 NOTE — PROGRESS NOTES
Orthosis    Diagnosis:   1. Closed traumatic displaced fracture of distal end of left radius, initial encounter  Ambulatory Referral to Occupational Therapy        Indication: Fracture    Location: Left  wrist  Supplies: Custom Fit Orthotic and Skin coverage   Orthosis type: Wrist splint  Wearing Schedule: Remove for hygiene only and Remove for HEP  Describe Position: function    Precautions: Fracture    Patient or Caregiver expresses understanding of wearing Schedule and Precautions? Yes  Patient or Caregiver able to don/doff orthotic independently?Yes    Written orders provided to patient? Yes  Orders Obtained: Written  Orders Obtained from: Dr Avendano    Return for evaluation and treatment Yes- [place]

## 2024-03-19 NOTE — PROGRESS NOTES
Assessment/Plan:  1. Closed traumatic displaced fracture of distal end of left radius, initial encounter  XR wrist 3+ vw left    Ambulatory Referral to Occupational Therapy          I reviewed the x-rays with the patient today.  There has been no change in the position of the fracture when compared to x-rays obtained 2 weeks ago.  There had been a change in position when compared to the postreduction films to the films.  Treatment options were again discussed which included nonoperative and operative treatment.    Risks, benefits, and realistic expectations for treatment options were discussed.    The patient was given an opportunity to ask questions.  Questions were answered to the patient's satisfaction.    Through shared decision making, the patient decided to move forward with continued nonoperative management and moving forward with a removable splint and therapy.  Follow-up in 4 weeks for clinical and radiographic evaluation of the left wrist to include PA, lateral, and articular surface views out of the splint.          cc: Follow-up for my wrist    Subjective:   Yudi Barnhart is a right hand dominant 68 y.o. female who presents for follow-up of a left distal radius fracture sustained on 2/18/2024.  At the initial office visit the following day, I performed a closed reduction and application of a short arm cast.  The patient has tolerated the cast well.  No complaints at today's office visit.        Objective:  Vitals:    03/19/24 1012   BP: 159/90       The patient was awake, alert, and oriented to person, place, and time.  No acute distress.  Normocephalic.  EOMI.  Mucous membranes moist.  Normal respiratory effort.    Cast was removed.  Very mild deformity at wrist. The patient has good finger range of motion, although not full composite flexion.  Mild tenderness to palpation at fracture site. .      Imaging/Diagnostic Studies:    I reviewed imaging studies obtained in the office today which included PA,  lateral, and articular surface views.  When compared to the original postreduction films, there has been a loss of radial height and inclination.  There is slight dorsal translation of the distal fracture fragment which is unchanged when compared to original postreduction films.  Volar tilt remains neutral.      This document was created using speech voice recognition software.   Grammatical errors, random word insertions, pronoun errors, and incomplete sentences are an occasional consequence of this system due to software limitations, ambient noise, and hardware issues.   Any formal questions or concerns about content, text, or information contained within the body of this dictation should be directly addressed to the provider for clarification.

## 2024-03-26 ENCOUNTER — EVALUATION (OUTPATIENT)
Dept: PHYSICAL THERAPY | Facility: MEDICAL CENTER | Age: 69
End: 2024-03-26
Payer: COMMERCIAL

## 2024-03-26 DIAGNOSIS — S52.592D OTHER CLOSED FRACTURE OF DISTAL END OF LEFT RADIUS WITH ROUTINE HEALING, SUBSEQUENT ENCOUNTER: Primary | ICD-10-CM

## 2024-03-26 PROCEDURE — 97140 MANUAL THERAPY 1/> REGIONS: CPT | Performed by: PHYSICAL THERAPIST

## 2024-03-26 PROCEDURE — 97161 PT EVAL LOW COMPLEX 20 MIN: CPT | Performed by: PHYSICAL THERAPIST

## 2024-03-26 NOTE — PROGRESS NOTES
PT Evaluation     Today's date: 3/26/2024  Patient name: Yudi Barnhart  : 1955  MRN: [id number]  Referring provider: Alethea Avendano,*  Dx:   Encounter Diagnosis     ICD-10-CM    1. Other closed fracture of distal end of left radius with routine healing, subsequent encounter  S52.592D           Start Time: 1515  Stop Time: 1615  Total time in clinic (min): 60 minutes    Assessment  Assessment details: Pt is a 68 year old RHD female who presents to PT following L distal radius fracture sustained on 24. Pt presents with tenderness to her thenar region from pressure from previous cast, no tenderness to distal radius, or shaft. She has moderate edema along her volar wrist and dorsal hand. She has moderate limitation in her wrist and forearm ROM, limited AROM in her digits yet full passive ROM. Pt should benefit from skilled PT to help reduce edema and pain, improve her ROM and mobility, increase strength when appropriate, and better her overall functioning. Thank you kindly for your referral.   Impairments: abnormal or restricted ROM, activity intolerance, impaired physical strength, lacks appropriate home exercise program and pain with function    Symptom irritability: moderateUnderstanding of Dx/Px/POC: good   Prognosis: good    Goals  STG (2-3 weeks)  1: Pt independent in HEP  2: improve AROM 10-15 degrees  3: full comp flexion of digits  4: full thumb opposition  5: reduce edema by 25%    LTG (4-6 weeks)  1: Improve FOTO 10-15 pts  2: AROM WFL  3: Pt able to complete greater than 75% of ADL's  4: wrist stabilizers 4 to 4+/5 throughout  5: Pt able to perform gardening activities without limitation    Plan  Plan details: Initiate PT as per POC  Patient would benefit from: skilled physical therapy  Planned modality interventions: thermotherapy: hydrocollator packs  Planned therapy interventions: joint mobilization, manual therapy, massage, neuromuscular re-education, orthotic management and training,  patient education, stretching, therapeutic activities, therapeutic exercise, flexibility, fine motor coordination training, functional ROM exercises and home exercise program  Frequency: 1x week (high copay)  Duration in visits: 8  Duration in weeks: 8  Plan of Care beginning date: 3/26/2024  Plan of Care expiration date: 2024  Treatment plan discussed with: patient        Subjective Evaluation    History of Present Illness  Date of onset: 2024  Mechanism of injury: trauma  Mechanism of injury: Fell on ice  Casted 4 weeks  Prior break at age 14  Occasional tingling in thumb  R carpectomy (talsania)          Not a recurrent problem   Quality of life: good    Patient Goals  Patient goals for therapy: independence with ADLs/IADLs, decreased pain, increased motion, increased strength and return to sport/leisure activities    Pain  Current pain ratin  At best pain ratin  At worst pain ratin  Quality: discomfort and throbbing  Relieving factors: medications (CBD oil; ibuprofen)  Exacerbated by: gentle ROM.  Progression: improved    Social Support    Employment status: not working (retired)  Hand dominance: right  Life stress: gardening, walking          Objective     Active Range of Motion     Left Wrist   Wrist flexion: 20 degrees   Wrist extension: 20 degrees   Radial deviation: 15 degrees   Ulnar deviation: 22 degrees     Right Wrist   Wrist flexion: 45 degrees   Wrist extension: 40 degrees     Additional Active Range of Motion Details  Sup/pron 50/50  Comp flexion 75%, no passive restriction  Thumb opposition to tip of RF; pain into palm when trying to reach each digit    Passive Range of Motion     Left Wrist   Wrist flexion: 45 degrees   Wrist extension: 30 degrees     Additional Passive Range of Motion Details  Soft end feel  Supination 65; pron 70             Precautions: DOI 24  HEP: TGE's, digit abd, th opposition, wrist and forearm A/AAROM, towel bunches    Manuals             Retro  massage             Wrist, FA AAROM             Digit PROM, place-holds                          Neuro Re- Ed             Wrist maze             Grooved pegs             Pegs grasping                                                                 Ther Ex             Towel bunches              Ball roll for wrist             Wrist AROM             Forearm roller                                                                 Ther Activity                                       Gait Training                                       Modalities

## 2024-04-02 ENCOUNTER — OFFICE VISIT (OUTPATIENT)
Dept: PHYSICAL THERAPY | Facility: MEDICAL CENTER | Age: 69
End: 2024-04-02
Payer: COMMERCIAL

## 2024-04-02 DIAGNOSIS — S52.592D OTHER CLOSED FRACTURE OF DISTAL END OF LEFT RADIUS WITH ROUTINE HEALING, SUBSEQUENT ENCOUNTER: Primary | ICD-10-CM

## 2024-04-02 PROCEDURE — 97140 MANUAL THERAPY 1/> REGIONS: CPT | Performed by: PHYSICAL THERAPIST

## 2024-04-02 PROCEDURE — 97110 THERAPEUTIC EXERCISES: CPT | Performed by: PHYSICAL THERAPIST

## 2024-04-02 NOTE — PROGRESS NOTES
Daily Note     Today's date: 2024  Patient name: Yudi Barnhart  : 1955  MRN: [id number]  Referring provider: Alethea Avendano,*  Dx:   Encounter Diagnosis     ICD-10-CM    1. Other closed fracture of distal end of left radius with routine healing, subsequent encounter  S52.592D                      Subjective: Pt reports she has been doing her exercises, using massage to help with pain relief      Objective: See treatment diary below      Assessment: Tolerated treatment well. Patient exhibited good technique with therapeutic exercises and would benefit from continued PT  Wrist ROM improving  Wrist flexion 40 degrees; extension 20 degrees passively  Supination 75/ pron 80  Initiated program, pt tolerated session well    Plan: Continue per plan of care.      Precautions: DOI 24  HEP: TGE's, digit abd, th opposition, wrist and forearm A/AAROM, towel bunches    Manuals /            Retro massage 5            Wrist, FA AAROM 10            Digit PROM, place-holds 5             20 + MH            Neuro Re- Ed             Wrist maze             Grooved pegs             Pegs grasping 3 min                                                                Ther Ex             Towel bunches 2 min             Ball roll for wrist 3 min            Wrist AROM 15x each            Forearm rotation With cone 15x each                                                    15            Ther Activity                                       Gait Training                                       Modalities

## 2024-04-04 DIAGNOSIS — I10 ESSENTIAL HYPERTENSION: ICD-10-CM

## 2024-04-04 RX ORDER — LISINOPRIL 10 MG/1
10 TABLET ORAL DAILY
Qty: 90 TABLET | Refills: 1 | Status: SHIPPED | OUTPATIENT
Start: 2024-04-04

## 2024-04-09 ENCOUNTER — OFFICE VISIT (OUTPATIENT)
Dept: PHYSICAL THERAPY | Facility: MEDICAL CENTER | Age: 69
End: 2024-04-09
Payer: COMMERCIAL

## 2024-04-09 DIAGNOSIS — S52.592D OTHER CLOSED FRACTURE OF DISTAL END OF LEFT RADIUS WITH ROUTINE HEALING, SUBSEQUENT ENCOUNTER: Primary | ICD-10-CM

## 2024-04-09 PROCEDURE — 97140 MANUAL THERAPY 1/> REGIONS: CPT | Performed by: PHYSICAL THERAPIST

## 2024-04-09 PROCEDURE — 97112 NEUROMUSCULAR REEDUCATION: CPT | Performed by: PHYSICAL THERAPIST

## 2024-04-09 PROCEDURE — 97110 THERAPEUTIC EXERCISES: CPT | Performed by: PHYSICAL THERAPIST

## 2024-04-09 NOTE — PROGRESS NOTES
Daily Note     Today's date: 2024  Patient name: Yudi Barnhart  : 1955  MRN: [id number]  Referring provider: Alethea Avendano,*  Dx:   Encounter Diagnosis     ICD-10-CM    1. Other closed fracture of distal end of left radius with routine healing, subsequent encounter  S52.592D                      Subjective: Pt reports she still has soreness with the stretching exercises but its getting better. Sometimes forgets to not use L hand for certain activities.       Objective: See treatment diary below      Assessment: Tolerated treatment well. Patient exhibited good technique with therapeutic exercises and would benefit from continued PT  AAROM ext/flex 40/48  AROM 38/40  Supination 80/ pronation 55; AAROM 65  Improved ROM in all directions  RTD  24.       Plan: Continue per plan of care.      Precautions: DOI 24  HEP: TGE's, digit abd, th opposition, wrist and forearm A/AAROM, towel bunches    Manuals            Retro massage 5 5           Wrist, FA AAROM 10 10           Digit PROM, place-holds 5 5            20 + MH 20           Neuro Re- Ed             Wrist maze  3 min           Grooved pegs  NV           Pegs grasping 3 min 3 min           Pegs prehension  3 min                                       10           Ther Ex             Towel bunches 2 min 2 min            Ball roll for wrist 3 min 3 min           Wrist AROM 15x each 20x           Forearm rotation With cone 15x each 20x                                                   15 10           Ther Activity                                       Gait Training                                       Modalities

## 2024-04-15 DIAGNOSIS — K21.9 GASTROESOPHAGEAL REFLUX DISEASE, UNSPECIFIED WHETHER ESOPHAGITIS PRESENT: ICD-10-CM

## 2024-04-15 RX ORDER — OMEPRAZOLE 20 MG/1
20 CAPSULE, DELAYED RELEASE ORAL
Qty: 90 CAPSULE | Refills: 0 | Status: SHIPPED | OUTPATIENT
Start: 2024-04-15

## 2024-04-16 ENCOUNTER — OFFICE VISIT (OUTPATIENT)
Dept: PHYSICAL THERAPY | Facility: MEDICAL CENTER | Age: 69
End: 2024-04-16
Payer: COMMERCIAL

## 2024-04-16 DIAGNOSIS — S52.592D OTHER CLOSED FRACTURE OF DISTAL END OF LEFT RADIUS WITH ROUTINE HEALING, SUBSEQUENT ENCOUNTER: Primary | ICD-10-CM

## 2024-04-16 PROCEDURE — 97110 THERAPEUTIC EXERCISES: CPT | Performed by: PHYSICAL THERAPIST

## 2024-04-16 PROCEDURE — 97140 MANUAL THERAPY 1/> REGIONS: CPT | Performed by: PHYSICAL THERAPIST

## 2024-04-16 NOTE — PROGRESS NOTES
Daily Note     Today's date: 2024  Patient name: Yudi Barnhart  : 1955  MRN: [id number]  Referring provider: Alethea Avendano,*  Dx:   Encounter Diagnosis     ICD-10-CM    1. Other closed fracture of distal end of left radius with routine healing, subsequent encounter  S52.592D                      Subjective: Pt reports her pain has been improving, less soreness after her exercises. Pain went from a 5 to a 1/10 after.       Objective: See treatment diary below      Assessment: Tolerated treatment well. Patient exhibited good technique with therapeutic exercises and would benefit from continued PT  AAROM wrist flex 40/ ext 50  Sup 70  Pronation 60  Pt still has mild to moderate tension on her extrinsic extensors, demonstrated straight fist stretch to help with their flexibility. Pt able to demonstrate  Await 's approval to initiate light strengthening. Pt should be a good candidate because of the lessening of her pain and being 8 weeks s/p. Advised her to continue to wean out of orthosis, pt reported she wears it mainly with activity.   No complaints post session      Plan: Continue per plan of care.      Precautions: DOI 24  HEP: TGE's, digit abd, th opposition, wrist and forearm A/AAROM, towel bunches    Manuals           Retro massage 5 5           Wrist, FA AAROM 10 10 10          Digit PROM, place-holds 5 5 5           20 + MH 20 15          Neuro Re- Ed             Wrist maze  3 min 3 min          Grooved pegs  NV 2 boards          Pegs grasping 3 min 3 min 3 min          Pegs prehension  3 min 3 min                                      10 15          Ther Ex             Towel bunches 2 min 2 min 2 min           Ball roll for wrist 3 min 3 min 3 min          Wrist AROM 15x each 20x 20x          Forearm rotation With cone 15x each 20x 20x                                                  15 10 10          Ther Activity                                       Gait Training                                        Modalities

## 2024-04-17 ENCOUNTER — APPOINTMENT (OUTPATIENT)
Dept: RADIOLOGY | Facility: CLINIC | Age: 69
End: 2024-04-17
Payer: COMMERCIAL

## 2024-04-17 ENCOUNTER — OFFICE VISIT (OUTPATIENT)
Dept: OBGYN CLINIC | Facility: CLINIC | Age: 69
End: 2024-04-17

## 2024-04-17 VITALS
BODY MASS INDEX: 29.59 KG/M2 | DIASTOLIC BLOOD PRESSURE: 85 MMHG | HEIGHT: 63 IN | SYSTOLIC BLOOD PRESSURE: 126 MMHG | HEART RATE: 88 BPM | WEIGHT: 167 LBS

## 2024-04-17 DIAGNOSIS — S52.502A CLOSED TRAUMATIC DISPLACED FRACTURE OF DISTAL END OF LEFT RADIUS, INITIAL ENCOUNTER: Primary | ICD-10-CM

## 2024-04-17 DIAGNOSIS — S52.502A CLOSED TRAUMATIC DISPLACED FRACTURE OF DISTAL END OF LEFT RADIUS, INITIAL ENCOUNTER: ICD-10-CM

## 2024-04-17 PROCEDURE — 99024 POSTOP FOLLOW-UP VISIT: CPT | Performed by: ORTHOPAEDIC SURGERY

## 2024-04-17 PROCEDURE — 73110 X-RAY EXAM OF WRIST: CPT

## 2024-04-17 NOTE — PROGRESS NOTES
Assessment/Plan:  1. Closed traumatic displaced fracture of distal end of left radius, initial encounter  XR wrist 3+ vw left          Patient is following-up of a left distal radius fracture sustained on 2/18/2024 and treated with closed reduction and application of a cast.  Over time there was a change in the position of the fracture. Patient offered surgery, but declined.  She is satisfied with her progress thus far.  Recommended continued therapy and splint use when outside the home.  Discussed risk of refracture.  The patient was given an opportunity to ask questions.  Questions were answered to the patient's satisfaction.    Through shared decision making, the patient decided to move forward with  continued nonoperative management with a removable splint and therapy.   Patient may remove splint when doing gentle ROM and light activities.   Patient will continue to work with therapy to work on ROM and strengthening.  Discussed with patient she should not lift push or pull no greater than 1 lb.   Patient is pleased with conservative treatment and would like to continue with this plan of action even though surgical intervention was encouraged.  Patient states she would not like surgical interventions and will continue to work with conservative treatments.  Follow-up in 4 weeks for clinical and radiographic evaluation of the left wrist to include PA, lateral, and articular surface views out of the splint.    cc: cc: Follow-up for my wrist     Subjective:   Yudi Barnhart is a right hand dominant 68 y.o. female who presents states she is doing well at today's visit.  Patient states today she has been able to go to physical therapy and work on her range of motion of her wrist.  Patient states she has been compliant with splint usage and has been removing as needed for hygiene use.  Patient states she does get occasional pain within her arm but states that has been tolerable for her.  Patient states she is overall  pleased with her progress and would still like to continue with conservative treatments even though surgical interventions was offered to her in the past.        Review of Systems   Constitutional:  Positive for activity change. Negative for chills, fever and unexpected weight change.   HENT:  Negative for hearing loss, nosebleeds and sore throat.    Eyes:  Negative for pain, redness and visual disturbance.   Respiratory:  Negative for cough, shortness of breath and wheezing.    Cardiovascular:  Negative for chest pain, palpitations and leg swelling.   Gastrointestinal:  Negative for abdominal pain, nausea and vomiting.   Endocrine: Negative for polydipsia and polyuria.   Genitourinary:  Negative for dysuria and hematuria.   Musculoskeletal:  Positive for arthralgias.        See HPI   Skin:  Negative for rash and wound.   Neurological:  Negative for dizziness, numbness and headaches.   Psychiatric/Behavioral:  Negative for decreased concentration and suicidal ideas. The patient is not nervous/anxious.          Past Medical History:   Diagnosis Date    Deficiency of anterior cruciate ligament 2019    Diverticulosis     of colon. 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 Dr. Law    CARPECTOMY PROXIMAL ROW Right     COLONOSCOPY  2022    ONE YEAR RECOMMENDED = POOR PREP    DILATION AND CURETTAGE OF UTERUS  2007    MMR. Polyp benign. Dr. Lofton, hysteroscopy    HAND SURGERY      INDUCED       Surgically induced. By dilation and curettage. 20 yo    OTHER SURGICAL HISTORY  2005    Anterior  Colporrhaphy (for pelvic relaxation). Dr. Cain    TUBAL LIGATION Bilateral     28 yo    URETHRAL SLING  2005    Mid. TOT. Dr. Cain (c ant colporr, Prolift, extraperitoneal colpopexy    UTERINE SUSPENSION  2005    Prolift, mesh. Dr. Cain    WRIST SURGERY         Family History   Problem Relation Age of Onset    Hypertension Mother 80    Dementia Father         due tp a series of strokes    Hypertension Father     Stroke Father     Hydrocephalus Father         had shunt    No Known Problems Sister     No Known Problems Sister     Alcohol abuse Brother     Drug abuse Brother     Hypertension Brother     Breast cancer Maternal Grandmother         MA x2    Dementia Maternal Grandmother         Several a, u's    Deep vein thrombosis Maternal Grandmother         Pregnancy related    COPD Maternal Grandfather     Osteoporosis Paternal Grandmother     Emphysema Paternal Grandfather     Heart disease Paternal Grandfather         Ischemic    Migraines Daughter     Migraines Son     Breast cancer Maternal Aunt         MA x2    Dementia Paternal Aunt         Several a, u's    Dementia Paternal Uncle         Several a, u's    Colon cancer Cousin     Diabetes type I Other         Mellitus    Lung disease Family     Ovarian cancer Neg Hx        Social History     Occupational History    Occupation: Customer Service--Retired     Comment: [employer]   Tobacco Use    Smoking status: Former     Current packs/day: 0.00     Average packs/day: 0.3 packs/day for 5.2 years (1.3 ttl pk-yrs)     Types: Cigarettes     Start date: 1972     Quit date: 1977     Years since quittin.3    Smokeless tobacco: Never    Tobacco comments:     Secondhand smoke exposure.  smoked in house until .  41 yrs 2017   Vaping Use    Vaping status: Never Used   Substance and Sexual Activity    Alcohol use: Yes     Comment: seldom, social drinks if any    Drug use: No    Sexual activity: Not Currently     Partners:  Male     Birth control/protection: Female Sterilization     Comment: lifetime partners: 3;  1978         Current Outpatient Medications:     acetaminophen (TYLENOL) 500 mg tablet, Take 500 mg by mouth every 6 (six) hours as needed, Disp: , Rfl:     Calcium Carb-Cholecalciferol (Calcium 500 + D) 500-5 MG-MCG TABS, Take 500 mg by mouth daily, Disp: , Rfl:     cholecalciferol (VITAMIN D3) 1,000 units tablet, Take 1 tablet (1,000 Units total) by mouth daily, Disp: 30 tablet, Rfl: 0    diphenhydrAMINE-acetaminophen (TYLENOL PM)  MG TABS, Take by mouth, Disp: , Rfl:     fluocinolone (SYNALAR) 0.01 % external solution, APPLY TO AFFECTED AREA OF SCALP NIGHTLY FOR 2 WEEKS AND THEN DECREASE TO 2-3 TIMES A WEEK FOR MAINTENANCE. AVOID FACE, Disp: , Rfl:     hydrocortisone 2.5 % cream, MIX PEA SZIED DOSE WITH KETOCONAZOLE CREAM AND APPLY TO AFFECTED AREA TWICE A DAY FOR 2 WEEKS THEN 1-2 TIMES PER WEEK AS NEEDED, Disp: , Rfl:     ketoconazole (NIZORAL) 2 % cream, APPLY TOPICALLY TO AFFECTED AREA ON FACE, EARS TWICE A DAY AS DIRECTED, Disp: , Rfl:     lisinopril (ZESTRIL) 10 mg tablet, Take 1 tablet (10 mg total) by mouth daily, Disp: 90 tablet, Rfl: 1    LORazepam (ATIVAN) 0.5 mg tablet, Take 1 tablet (0.5 mg total) by mouth as needed for anxiety, Disp: 30 tablet, Rfl: 0    omeprazole (PriLOSEC) 20 mg delayed release capsule, Take 1 capsule (20 mg total) by mouth daily before breakfast, Disp: 90 capsule, Rfl: 0    Saccharomyces boulardii (Probiotic) 250 MG CAPS, Take by mouth, Disp: , Rfl:     No Known Allergies    Objective:  Vitals:    04/17/24 1302   BP: 126/85   Pulse: 88       The patient was awake, alert, and oriented to person, place, and time.  No acute distress.  Normocephalic.  EOMI.  Mucous membranes moist.  Normal respiratory effort.    Examination of the  left wrist. Skin was intact.  No swelling or ecchymosis.  Obvious deformity at the wrist.  Hand and fingers were warm and well-perfused.  Capillary  refill was brisk.  Full active range of motion of the elbows, forearms, and fingers.    Limited wrist range of motion.  Able to make full composite fist  No tenderness to palpation at fracture site.     Imaging/Diagnostic Studies:    I reviewed imaging studies dated 4/17/2024 which included left wrist XR with PA, lateral, and articular surface views.  These images studies demonstrated slight increase in dorsal angulation compared to x-rays from 4 weeks ago.  Interval signs of healing.        This document was created using speech voice recognition software.   Grammatical errors, random word insertions, pronoun errors, and incomplete sentences are an occasional consequence of this system due to software limitations, ambient noise, and hardware issues.   Any formal questions or concerns about content, text, or information contained within the body of this dictation should be directly addressed to the provider for clarification.      Scribe Attestation      I,:  Abdullahi Franklin am acting as a scribe while in the presence of the attending physician.:       I,:  Alethea Avendano MD personally performed the services described in this documentation    as scribed in my presence.:

## 2024-04-23 ENCOUNTER — OFFICE VISIT (OUTPATIENT)
Dept: PHYSICAL THERAPY | Facility: MEDICAL CENTER | Age: 69
End: 2024-04-23
Payer: COMMERCIAL

## 2024-04-23 DIAGNOSIS — S52.592D OTHER CLOSED FRACTURE OF DISTAL END OF LEFT RADIUS WITH ROUTINE HEALING, SUBSEQUENT ENCOUNTER: Primary | ICD-10-CM

## 2024-04-23 PROCEDURE — 97140 MANUAL THERAPY 1/> REGIONS: CPT | Performed by: PHYSICAL THERAPIST

## 2024-04-23 PROCEDURE — 97112 NEUROMUSCULAR REEDUCATION: CPT | Performed by: PHYSICAL THERAPIST

## 2024-04-23 PROCEDURE — 97110 THERAPEUTIC EXERCISES: CPT | Performed by: PHYSICAL THERAPIST

## 2024-04-23 NOTE — PROGRESS NOTES
Daily Note     Today's date: 2024  Patient name: Yudi Barnhart  : 1955  MRN: [id number]  Referring provider: Alethea Avendano,*  Dx:   Encounter Diagnosis     ICD-10-CM    1. Other closed fracture of distal end of left radius with routine healing, subsequent encounter  S52.592D                      Subjective: Pt reports she saw Dr. Avendano, she wants her to continue with wearing orthosis for another 4 weeks. Saw on lateral view the bone still has more healing to do. Pain is still well controlled.       Objective: See treatment diary below      Assessment: Tolerated treatment well. Patient exhibited good technique with therapeutic exercises and would benefit from continued PT  Kept program stable  Mild stiffness moving into wrist extension, no overpressure  No complaints during or post exercises      Plan: Continue per plan of care.      Precautions: DOI 24  HEP: TGE's, digit abd, th opposition, wrist and forearm A/AAROM, towel bunches    Manuals          Retro massage 5 5           Wrist, FA AAROM 10 10 10 10         Digit PROM, place-holds 5 5 5 5          20 + MH 20 15 15         Neuro Re- Ed             Wrist maze  3 min 3 min 3 min         Grooved pegs  NV 2 boards 2 boards         Pegs grasping 3 min 3 min 3 min 3 min         Pegs prehension  3 min 3 min 3 min                                     10 15 15         Ther Ex             Towel bunches 2 min 2 min 2 min 2 min          Ball roll for wrist 3 min 3 min 3 min 3 min         Wrist AROM 15x each 20x 20x 20x         Forearm rotation With cone 15x each 20x 20x 20x                                                 15 10 10 10         Ther Activity                                       Gait Training                                       Modalities

## 2024-04-30 ENCOUNTER — OFFICE VISIT (OUTPATIENT)
Dept: PHYSICAL THERAPY | Facility: MEDICAL CENTER | Age: 69
End: 2024-04-30
Payer: COMMERCIAL

## 2024-04-30 DIAGNOSIS — S52.592D OTHER CLOSED FRACTURE OF DISTAL END OF LEFT RADIUS WITH ROUTINE HEALING, SUBSEQUENT ENCOUNTER: Primary | ICD-10-CM

## 2024-04-30 PROCEDURE — 97140 MANUAL THERAPY 1/> REGIONS: CPT | Performed by: PHYSICAL THERAPIST

## 2024-04-30 PROCEDURE — 97110 THERAPEUTIC EXERCISES: CPT | Performed by: PHYSICAL THERAPIST

## 2024-04-30 PROCEDURE — 97112 NEUROMUSCULAR REEDUCATION: CPT | Performed by: PHYSICAL THERAPIST

## 2024-04-30 NOTE — PROGRESS NOTES
Daily Note     Today's date: 2024  Patient name: Yudi Barnhart  : 1955  MRN: [id number]  Referring provider: Alethea Avendano,*  Dx:   Encounter Diagnosis     ICD-10-CM    1. Other closed fracture of distal end of left radius with routine healing, subsequent encounter  S52.592D                      Subjective: Pt reports her wrist is feeling good overall.       Objective: See treatment diary below      Assessment: Tolerated treatment well. Patient exhibited good technique with therapeutic exercises and would benefit from continued PT  Wrist ROM stable      Plan: Continue per plan of care.      Precautions: DOI 24  HEP: TGE's, digit abd, th opposition, wrist and forearm A/AAROM, towel bunches    Manuals         Retro massage 5 5           Wrist, FA AAROM 10 10 10 10 10        Digit PROM, place-holds 5 5 5 5 5         20 + MH 20 15 15 15        Neuro Re- Ed             Wrist maze  3 min 3 min 3 min 3 min        Grooved pegs  NV 2 boards 2 boards 2 boards        Pegs grasping 3 min 3 min 3 min 3 min 3 min        Pegs prehension  3 min 3 min 3 min                                     10 15 15 10        Ther Ex             Towel bunches 2 min 2 min 2 min 2 min 2 min         Ball roll for wrist 3 min 3 min 3 min 3 min 3 min        Wrist AROM 15x each 20x 20x 20x 2x10        Forearm rotation With cone 15x each 20x 20x 20x 2x10                                                15 10 10 10 10        Ther Activity                                       Gait Training                                       Modalities

## 2024-05-07 ENCOUNTER — OFFICE VISIT (OUTPATIENT)
Dept: PHYSICAL THERAPY | Facility: MEDICAL CENTER | Age: 69
End: 2024-05-07
Payer: COMMERCIAL

## 2024-05-07 DIAGNOSIS — S52.592D OTHER CLOSED FRACTURE OF DISTAL END OF LEFT RADIUS WITH ROUTINE HEALING, SUBSEQUENT ENCOUNTER: Primary | ICD-10-CM

## 2024-05-07 PROCEDURE — 97140 MANUAL THERAPY 1/> REGIONS: CPT

## 2024-05-07 PROCEDURE — 97110 THERAPEUTIC EXERCISES: CPT

## 2024-05-07 NOTE — PROGRESS NOTES
Daily Note     Today's date: 2024  Patient name: Yudi Barnhart  : 1955  MRN: [id number]  Referring provider: Alethea Avendano,*  Dx:   Encounter Diagnosis     ICD-10-CM    1. Other closed fracture of distal end of left radius with routine healing, subsequent encounter  S52.592D           Start Time: 1408  Stop Time: 1448  Total time in clinic (min): 40 minutes    Subjective: Pt reports her L wrist has been doing good; continues to see improvement in ROM.  Has follow up with doctor next week.        Objective: See treatment diary below      Assessment: Tolerated treatment well. Continued with program as outlined below.  Is challenged with current program but able to complete without any significant aggravation of symptoms.  Notable limitations in available wrist AAROM and digit PROM, but is making steady progress toward long term goals.  Patient would benefit from continued PT.      Plan: Continue per plan of care.      Precautions: DOI 24  HEP: TGE's, digit abd, th opposition, wrist and forearm A/AAROM, towel bunches    Manuals  57       Retro massage 5 5           Wrist, FA AAROM 10 10 10 10 10 10       Digit PROM, place-holds 5 5 5 5 5 5        20 + MH 20 15 15 15 15       Neuro Re- Ed             Wrist maze  3 min 3 min 3 min 3 min 3 min       Grooved pegs  NV 2 boards 2 boards 2 boards 2 boards       Pegs grasping 3 min 3 min 3 min 3 min 3 min 3 min       Pegs prehension  3 min 3 min 3 min                                     10 15 15 10 10       Ther Ex             Towel bunches 2 min 2 min 2 min 2 min 2 min 2 min        Ball roll for wrist 3 min 3 min 3 min 3 min 3 min 3 min       Wrist AROM 15x each 20x 20x 20x 2x10 2x10       Forearm rotation With cone 15x each 20x 20x 20x 2x10 2x10                                               15 10 10 10 10 10       Ther Activity                                       Gait Training                                       Modalities

## 2024-05-14 ENCOUNTER — APPOINTMENT (OUTPATIENT)
Dept: PHYSICAL THERAPY | Facility: MEDICAL CENTER | Age: 69
End: 2024-05-14
Payer: COMMERCIAL

## 2024-05-15 ENCOUNTER — OFFICE VISIT (OUTPATIENT)
Dept: OBGYN CLINIC | Facility: CLINIC | Age: 69
End: 2024-05-15

## 2024-05-15 ENCOUNTER — APPOINTMENT (OUTPATIENT)
Dept: RADIOLOGY | Facility: CLINIC | Age: 69
End: 2024-05-15
Payer: COMMERCIAL

## 2024-05-15 VITALS
BODY MASS INDEX: 28.49 KG/M2 | HEIGHT: 63 IN | DIASTOLIC BLOOD PRESSURE: 74 MMHG | HEART RATE: 101 BPM | WEIGHT: 160.8 LBS | SYSTOLIC BLOOD PRESSURE: 123 MMHG

## 2024-05-15 DIAGNOSIS — S52.502D CLOSED TRAUMATIC DISPLACED FRACTURE OF DISTAL END OF LEFT RADIUS WITH ROUTINE HEALING, SUBSEQUENT ENCOUNTER: Primary | ICD-10-CM

## 2024-05-15 DIAGNOSIS — Z09 FRACTURE FOLLOW-UP: ICD-10-CM

## 2024-05-15 PROCEDURE — 99024 POSTOP FOLLOW-UP VISIT: CPT | Performed by: ORTHOPAEDIC SURGERY

## 2024-05-15 PROCEDURE — 73110 X-RAY EXAM OF WRIST: CPT

## 2024-05-15 RX ORDER — DIPHENHYDRAMINE HCL 25 MG
CAPSULE ORAL
COMMUNITY

## 2024-05-15 NOTE — PROGRESS NOTES
Assessment/Plan:  1. Closed traumatic displaced fracture of distal end of left radius with routine healing, subsequent encounter  Ambulatory Referral to PT/OT Hand Therapy      2. Fracture follow-up  XR wrist 3+ vw left          left distal radius fracture sustained on 2/18/2024 and treated with closed reduction and application of a cast.  Over time there was a change in the position of the fracture. Patient offered surgery, but declined. The patient is pleased with her outcome.  Discussed beginning to wean out of the splint and to begin strengthening in therapy.  The patient was given an opportunity to ask questions.  Questions were answered to the patient's satisfaction.    Through shared decision making, the patient decided to move forward with weaning out of the splint and strengthening in therapy.  Advised to wear the splint while out in the community and remove while at home and for sleep.  She will continue with therapy and can work on strengthening.  She will follow up in 4 weeks for repeat evaluation. No new x-rays are needed.          cc: left wrist follow up     Subjective:   Yudi Barnhart is a right hand dominant 68 y.o. female who presents a left distal radius fracture sustained on 2/18/2024 and treated with closed reduction and application of a cast.  Over time there was a change in the position of the fracture. Patient offered surgery, but declined. The patient states she is doing well. She has been attending therapy.       Review of Systems   Constitutional:  Negative for chills and fever.   HENT:  Negative for drooling and sneezing.    Eyes:  Negative for redness.   Respiratory:  Negative for cough and wheezing.    Gastrointestinal:  Negative for nausea and vomiting.   Musculoskeletal:  Negative for arthralgias, joint swelling and myalgias.   Neurological:  Negative for weakness and numbness.   Psychiatric/Behavioral:  Negative for behavioral problems. The patient is not nervous/anxious.           Past Medical History:   Diagnosis Date    Deficiency of anterior cruciate ligament 2019    Diverticulosis     of colon. 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 Dr. Law    CARPECTOMY PROXIMAL ROW Right     COLONOSCOPY  2022    ONE YEAR RECOMMENDED = POOR PREP    DILATION AND CURETTAGE OF UTERUS  2007    MMR. Polyp benign. Dr. Lofton, hysteroscopy    HAND SURGERY      INDUCED       Surgically induced. By dilation and curettage. 22 yo    OTHER SURGICAL HISTORY  2005    Anterior Colporrhaphy (for pelvic relaxation). Dr. Cain    TUBAL LIGATION Bilateral     28 yo    URETHRAL SLING  2005    Mid. TOT. Dr. Cain (c ant colporr, Prolift, extraperitoneal colpopexy    UTERINE SUSPENSION  2005    Prolift, mesh. Dr. Cain    WRIST SURGERY         Family History   Problem Relation Age of Onset    Hypertension Mother 80    Dementia Father         due tp a series of strokes    Hypertension Father     Stroke Father     Hydrocephalus Father         had shunt    No Known Problems Sister     No Known Problems Sister     Alcohol abuse Brother     Drug abuse Brother     Hypertension Brother     Breast cancer Maternal Grandmother         MA x2    Dementia Maternal Grandmother         Several a, u's    Deep vein thrombosis Maternal Grandmother         Pregnancy related    COPD Maternal Grandfather     Osteoporosis Paternal Grandmother     Emphysema Paternal Grandfather     Heart disease Paternal Grandfather         Ischemic    Migraines Daughter     Migraines Son     Breast cancer  Maternal Aunt         MA x2    Dementia Paternal Aunt         Several a, u's    Dementia Paternal Uncle         Several a, u's    Colon cancer Cousin     Diabetes type I Other         Mellitus    Lung disease Family     Ovarian cancer Neg Hx        Social History     Occupational History    Occupation: Customer Service--Retired     Comment: [employer]   Tobacco Use    Smoking status: Former     Current packs/day: 0.00     Average packs/day: 0.3 packs/day for 5.2 years (1.3 ttl pk-yrs)     Types: Cigarettes     Start date: 1972     Quit date: 1977     Years since quittin.4    Smokeless tobacco: Never    Tobacco comments:     Secondhand smoke exposure.  smoked in house until .  41 yrs 2017   Vaping Use    Vaping status: Never Used   Substance and Sexual Activity    Alcohol use: Yes     Comment: seldom, social drinks if any    Drug use: No    Sexual activity: Not Currently     Partners: Male     Birth control/protection: Female Sterilization     Comment: lifetime partners: 3;           Current Outpatient Medications:     acetaminophen (TYLENOL) 500 mg tablet, Take 500 mg by mouth every 6 (six) hours as needed, Disp: , Rfl:     Calcium Carb-Cholecalciferol (Calcium 500 + D) 500-5 MG-MCG TABS, Take 500 mg by mouth daily, Disp: , Rfl:     cholecalciferol (VITAMIN D3) 1,000 units tablet, Take 1 tablet (1,000 Units total) by mouth daily, Disp: 30 tablet, Rfl: 0    diphenhydrAMINE (Benadryl Allergy) 25 mg capsule, , Disp: , Rfl:     Ergocalciferol (VITAMIN D2 PO), , Disp: , Rfl:     fluocinolone (SYNALAR) 0.01 % external solution, APPLY TO AFFECTED AREA OF SCALP NIGHTLY FOR 2 WEEKS AND THEN DECREASE TO 2-3 TIMES A WEEK FOR MAINTENANCE. AVOID FACE, Disp: , Rfl:     hydrocortisone 2.5 % cream, MIX PEA SZIED DOSE WITH KETOCONAZOLE CREAM AND APPLY TO AFFECTED AREA TWICE A DAY FOR 2 WEEKS THEN 1-2 TIMES PER WEEK AS NEEDED, Disp: , Rfl:     ketoconazole (NIZORAL) 2 % cream, APPLY  TOPICALLY TO AFFECTED AREA ON FACE, EARS TWICE A DAY AS DIRECTED, Disp: , Rfl:     lisinopril (ZESTRIL) 10 mg tablet, Take 1 tablet (10 mg total) by mouth daily, Disp: 90 tablet, Rfl: 1    LORazepam (ATIVAN) 0.5 mg tablet, Take 1 tablet (0.5 mg total) by mouth as needed for anxiety, Disp: 30 tablet, Rfl: 0    omeprazole (PriLOSEC) 20 mg delayed release capsule, Take 1 capsule (20 mg total) by mouth daily before breakfast, Disp: 90 capsule, Rfl: 0    Saccharomyces boulardii (Probiotic) 250 MG CAPS, Take by mouth, Disp: , Rfl:     No Known Allergies    Objective:  Vitals:    05/15/24 1334   BP: 123/74   Pulse: 101       The patient was awake, alert, and oriented to person, place, and time.  No acute distress.  Normocephalic.  EOMI.  Mucous membranes moist.  Normal respiratory effort.    Examination of the affected extremity was compared to the unaffected extremity.  Skin was intact.  No swelling or ecchymosis.  No deformity.  Hand and fingers were warm and well-perfused.  Capillary refill was brisk.      Left wrist;  Mild TTP fx site- ulnar wrist  FROM fingers  Wrist ROM as expected     Imaging/Diagnostic Studies:    I reviewed imaging studies dated today which included x-rays left wrist .  These images studies demonstrated healed distal radius fracture unchanged in alignment.         This document was created using speech voice recognition software.   Grammatical errors, random word insertions, pronoun errors, and incomplete sentences are an occasional consequence of this system due to software limitations, ambient noise, and hardware issues.   Any formal questions or concerns about content, text, or information contained within the body of this dictation should be directly addressed to the provider for clarification.    Scribe Attestation      I,:  Vanda Lawrence MA am acting as a scribe while in the presence of the attending physician.:       I,:  Alethea Avendano MD personally performed the services  described in this documentation    as scribed in my presence.:

## 2024-05-22 ENCOUNTER — OFFICE VISIT (OUTPATIENT)
Dept: PHYSICAL THERAPY | Facility: MEDICAL CENTER | Age: 69
End: 2024-05-22
Payer: COMMERCIAL

## 2024-05-22 DIAGNOSIS — S52.592D OTHER CLOSED FRACTURE OF DISTAL END OF LEFT RADIUS WITH ROUTINE HEALING, SUBSEQUENT ENCOUNTER: Primary | ICD-10-CM

## 2024-05-22 PROCEDURE — 97110 THERAPEUTIC EXERCISES: CPT | Performed by: PHYSICAL THERAPIST

## 2024-05-22 PROCEDURE — 97140 MANUAL THERAPY 1/> REGIONS: CPT | Performed by: PHYSICAL THERAPIST

## 2024-05-22 NOTE — PROGRESS NOTES
Daily Note     Today's date: 2024  Patient name: Yudi Barnhart  : 1955  MRN: [id number]  Referring provider: Alethea Avendano,*  Dx:   Encounter Diagnosis     ICD-10-CM    1. Other closed fracture of distal end of left radius with routine healing, subsequent encounter  S52.592D                      Subjective: Pt reports she has been cleared to strengthen and D/C orthosis. Pt notes she has been using it more. Definitely notices more soreness when she uses it more. Able to use it to assist with gardening and mopping. Still taking it easy      Objective: See treatment diary below      Assessment: Tolerated treatment well. Patient exhibited good technique with therapeutic exercises and would benefit from continued PT  Initiated strengthening  ROM fairly stable, some stiffness moving into wrist flexion  Pt had some soreness with wrist curls  Provided with theraband for HEP and advised her to try water bottle or can of soup for 1# replacement.     Plan: Continue per plan of care.      Precautions: DOI 24  HEP: wrist PROM, wrist PRE's with L theraband    Manuals       Retro massage 5 5           Wrist, FA AAROM 10 10 10 10 10 10 10      Digit PROM, place-holds 5 5 5 5 5 5        20 + MH 20 15 15 15 15 10      Neuro Re- Ed             Wrist maze  3 min 3 min 3 min 3 min 3 min       Grooved pegs  NV 2 boards 2 boards 2 boards 2 boards       Pegs grasping 3 min 3 min 3 min 3 min 3 min 3 min       Pegs prehension  3 min 3 min 3 min                                     10 15 15 10 10       Ther Ex             Towel bunches 2 min 2 min 2 min 2 min 2 min 2 min        Ball roll for wrist 3 min 3 min 3 min 3 min 3 min 3 min 3 min      Wrist AROM 15x each 20x 20x 20x 2x10 2x10 1# 2x10      Forearm rotation With cone 15x each 20x 20x 20x 2x10 2x10 1# 2x10      fingerweb       Red 20x      flexbar       Yellow 20x                    15 10 10 10 10 10 20      Ther Activity                                        Gait Training                                       Modalities

## 2024-06-16 ENCOUNTER — OFFICE VISIT (OUTPATIENT)
Dept: URGENT CARE | Facility: CLINIC | Age: 69
End: 2024-06-16
Payer: COMMERCIAL

## 2024-06-16 VITALS
TEMPERATURE: 98.4 F | HEART RATE: 71 BPM | DIASTOLIC BLOOD PRESSURE: 78 MMHG | WEIGHT: 160 LBS | HEIGHT: 63 IN | BODY MASS INDEX: 28.35 KG/M2 | SYSTOLIC BLOOD PRESSURE: 132 MMHG | OXYGEN SATURATION: 97 %

## 2024-06-16 DIAGNOSIS — B02.9 HERPES ZOSTER WITHOUT COMPLICATION: Primary | ICD-10-CM

## 2024-06-16 PROCEDURE — 99213 OFFICE O/P EST LOW 20 MIN: CPT | Performed by: NURSE PRACTITIONER

## 2024-06-16 RX ORDER — VALACYCLOVIR HYDROCHLORIDE 1 G/1
1000 TABLET, FILM COATED ORAL 3 TIMES DAILY
Qty: 21 TABLET | Refills: 0 | Status: SHIPPED | OUTPATIENT
Start: 2024-06-16 | End: 2024-06-23

## 2024-06-16 NOTE — PROGRESS NOTES
[facility]        NAME: Yudi Barnhart is a 68 y.o. female  : 1955    MRN: [id number]  DATE: 2024  TIME: 1:07 PM    Assessment and Plan   Herpes zoster without complication [B02.9]  1. Herpes zoster without complication  valACYclovir (VALTREX) 1,000 mg tablet            Patient Instructions     Take med as prescribed  Follow up with PCP in 3-5 days.  Proceed to  ER if symptoms worsen.    If tests have been performed at [facility], our office will contact you with results if changes need to be made to the care plan discussed with you at the visit.  You can review your full results on [facility].    Chief Complaint     Chief Complaint   Patient presents with    Rash     Patient has a rash on the right side of her back that started yesterday. Thought it was a bug bite. Put hydrocortisone on area.          History of Present Illness       HPI  Reports a rash to the right flank. Started yesterday, no pain.     Review of Systems   Review of Systems   Constitutional:  Negative for fever.   Respiratory:  Negative for shortness of breath.    Cardiovascular:  Negative for chest pain.   Skin:  Positive for rash (right flank).   Neurological:  Negative for numbness.         Current Medications       Current Outpatient Medications:     valACYclovir (VALTREX) 1,000 mg tablet, Take 1 tablet (1,000 mg total) by mouth 3 (three) times a day for 7 days, Disp: 21 tablet, Rfl: 0    acetaminophen (TYLENOL) 500 mg tablet, Take 500 mg by mouth every 6 (six) hours as needed, Disp: , Rfl:     Calcium Carb-Cholecalciferol (Calcium 500 + D) 500-5 MG-MCG TABS, Take 500 mg by mouth daily, Disp: , Rfl:     cholecalciferol (VITAMIN D3) 1,000 units tablet, Take 1 tablet (1,000 Units total) by mouth daily, Disp: 30 tablet, Rfl: 0    diphenhydrAMINE (Benadryl Allergy) 25 mg capsule, , Disp: , Rfl:     Ergocalciferol (VITAMIN D2 PO), , Disp: , Rfl:     fluocinolone (SYNALAR) 0.01 % external solution, APPLY TO AFFECTED  AREA OF SCALP NIGHTLY FOR 2 WEEKS AND THEN DECREASE TO 2-3 TIMES A WEEK FOR MAINTENANCE. AVOID FACE, Disp: , Rfl:     hydrocortisone 2.5 % cream, MIX PEA SZIED DOSE WITH KETOCONAZOLE CREAM AND APPLY TO AFFECTED AREA TWICE A DAY FOR 2 WEEKS THEN 1-2 TIMES PER WEEK AS NEEDED, Disp: , Rfl:     ketoconazole (NIZORAL) 2 % cream, APPLY TOPICALLY TO AFFECTED AREA ON FACE, EARS TWICE A DAY AS DIRECTED, Disp: , Rfl:     lisinopril (ZESTRIL) 10 mg tablet, Take 1 tablet (10 mg total) by mouth daily, Disp: 90 tablet, Rfl: 1    LORazepam (ATIVAN) 0.5 mg tablet, Take 1 tablet (0.5 mg total) by mouth as needed for anxiety, Disp: 30 tablet, Rfl: 0    omeprazole (PriLOSEC) 20 mg delayed release capsule, Take 1 capsule (20 mg total) by mouth daily before breakfast, Disp: 90 capsule, Rfl: 0    Saccharomyces boulardii (Probiotic) 250 MG CAPS, Take by mouth, Disp: , Rfl:     Current Allergies     Allergies as of 06/16/2024    (No Known Allergies)            The following portions of the patient's history were reviewed and updated as appropriate: allergies, current medications, past family history, past medical history, past social history, past surgical history and problem list.     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 Dr. Law    CARPECTOMY PROXIMAL ROW Right     COLONOSCOPY  12/13/2022    ONE YEAR RECOMMENDED = POOR PREP    DILATION AND CURETTAGE OF UTERUS  05/2007    MMR. Polyp benign. Dr. Lofton,  "hysteroscopy    HAND SURGERY      INDUCED       Surgically induced. By dilation and curettage. 20 yo    OTHER SURGICAL HISTORY  2005    Anterior Colporrhaphy (for pelvic relaxation). Dr. Cain    TUBAL LIGATION Bilateral     26 yo    URETHRAL SLING  2005    Mid. TOT. Dr. Cain (c ant colporr, Prolift, extraperitoneal colpopexy    UTERINE SUSPENSION  2005    Prolift, mesh. Dr. Cain    WRIST SURGERY         Family History   Problem Relation Age of Onset    Hypertension Mother 80    Dementia Father         due tp a series of strokes    Hypertension Father     Stroke Father     Hydrocephalus Father         had shunt    No Known Problems Sister     No Known Problems Sister     Alcohol abuse Brother     Drug abuse Brother     Hypertension Brother     Breast cancer Maternal Grandmother         MA x2    Dementia Maternal Grandmother         Several a, u's    Deep vein thrombosis Maternal Grandmother         Pregnancy related    COPD Maternal Grandfather     Osteoporosis Paternal Grandmother     Emphysema Paternal Grandfather     Heart disease Paternal Grandfather         Ischemic    Migraines Daughter     Migraines Son     Breast cancer Maternal Aunt         MA x2    Dementia Paternal Aunt         Several a, u's    Dementia Paternal Uncle         Several a, u's    Colon cancer Cousin     Diabetes type I Other         Mellitus    Lung disease Family     Ovarian cancer Neg Hx          Medications have been verified.        Objective   /78   Pulse 71   Temp 98.4 °F (36.9 °C)   Ht 5' 3\" (1.6 m)   Wt 72.6 kg (160 lb)   SpO2 97%   BMI 28.34 kg/m²   No LMP recorded. Patient is postmenopausal.       Physical Exam     Physical Exam  Constitutional:       General: She is not in acute distress.     Appearance: She is not diaphoretic.   Skin:     Findings: Erythema and rash (localized elevated rash, no crusting, or drainage. A few are fluid filled. Seen along the T5 or T6 dermatome. Consistent with " shingles) present.

## 2024-07-10 DIAGNOSIS — I10 ESSENTIAL HYPERTENSION: ICD-10-CM

## 2024-07-10 DIAGNOSIS — K21.9 GASTROESOPHAGEAL REFLUX DISEASE, UNSPECIFIED WHETHER ESOPHAGITIS PRESENT: ICD-10-CM

## 2024-07-10 RX ORDER — OMEPRAZOLE 20 MG/1
20 CAPSULE, DELAYED RELEASE ORAL
Qty: 90 CAPSULE | Refills: 0 | Status: SHIPPED | OUTPATIENT
Start: 2024-07-10

## 2024-07-10 RX ORDER — LISINOPRIL 10 MG/1
10 TABLET ORAL DAILY
Qty: 90 TABLET | Refills: 0 | Status: SHIPPED | OUTPATIENT
Start: 2024-07-10

## 2024-10-08 DIAGNOSIS — K21.9 GASTROESOPHAGEAL REFLUX DISEASE, UNSPECIFIED WHETHER ESOPHAGITIS PRESENT: ICD-10-CM

## 2024-10-09 ENCOUNTER — HOSPITAL ENCOUNTER (OUTPATIENT)
Dept: MAMMOGRAPHY | Facility: MEDICAL CENTER | Age: 69
Discharge: HOME | End: 2024-10-09
Payer: COMMERCIAL

## 2024-10-09 VITALS — BODY MASS INDEX: 28.36 KG/M2 | HEIGHT: 63 IN | WEIGHT: 160.05 LBS

## 2024-10-09 DIAGNOSIS — Z12.31 VISIT FOR SCREENING MAMMOGRAM: ICD-10-CM

## 2024-10-09 DIAGNOSIS — K21.9 GASTROESOPHAGEAL REFLUX DISEASE, UNSPECIFIED WHETHER ESOPHAGITIS PRESENT: ICD-10-CM

## 2024-10-09 DIAGNOSIS — I10 ESSENTIAL HYPERTENSION: ICD-10-CM

## 2024-10-09 PROCEDURE — 77063 BREAST TOMOSYNTHESIS BI: CPT

## 2024-10-09 PROCEDURE — 77067 SCR MAMMO BI INCL CAD: CPT

## 2024-10-10 RX ORDER — LISINOPRIL 10 MG/1
10 TABLET ORAL DAILY
Qty: 90 TABLET | Refills: 0 | Status: SHIPPED | OUTPATIENT
Start: 2024-10-10

## 2025-01-03 DIAGNOSIS — K21.9 GASTROESOPHAGEAL REFLUX DISEASE, UNSPECIFIED WHETHER ESOPHAGITIS PRESENT: ICD-10-CM

## 2025-01-05 DIAGNOSIS — I10 ESSENTIAL HYPERTENSION: ICD-10-CM

## 2025-01-06 DIAGNOSIS — I10 ESSENTIAL HYPERTENSION: ICD-10-CM

## 2025-01-07 RX ORDER — LISINOPRIL 10 MG/1
10 TABLET ORAL DAILY
Qty: 90 TABLET | Refills: 1 | Status: SHIPPED | OUTPATIENT
Start: 2025-01-07

## 2025-01-07 RX ORDER — LISINOPRIL 10 MG/1
10 TABLET ORAL DAILY
Qty: 90 TABLET | Refills: 0 | Status: SHIPPED | OUTPATIENT
Start: 2025-01-07

## 2025-01-13 DIAGNOSIS — E04.0 SIMPLE GOITER: ICD-10-CM

## 2025-01-13 DIAGNOSIS — M81.0 AGE-RELATED OSTEOPOROSIS WITHOUT CURRENT PATHOLOGICAL FRACTURE: ICD-10-CM

## 2025-01-13 DIAGNOSIS — E78.5 HYPERLIPIDEMIA, UNSPECIFIED HYPERLIPIDEMIA TYPE: ICD-10-CM

## 2025-01-13 DIAGNOSIS — E55.9 VITAMIN D DEFICIENCY: ICD-10-CM

## 2025-01-13 DIAGNOSIS — N18.31 STAGE 3A CHRONIC KIDNEY DISEASE (HCC): Primary | ICD-10-CM

## 2025-01-21 ENCOUNTER — APPOINTMENT (OUTPATIENT)
Dept: LAB | Facility: CLINIC | Age: 70
End: 2025-01-21
Payer: COMMERCIAL

## 2025-01-21 LAB
25(OH)D3 SERPL-MCNC: 71.8 NG/ML (ref 30–100)
ALBUMIN SERPL BCG-MCNC: 3.2 G/DL (ref 3.5–5)
ALP SERPL-CCNC: 85 U/L (ref 34–104)
ALT SERPL W P-5'-P-CCNC: 12 U/L (ref 7–52)
ANION GAP SERPL CALCULATED.3IONS-SCNC: 7 MMOL/L (ref 4–13)
AST SERPL W P-5'-P-CCNC: 9 U/L (ref 13–39)
BASOPHILS # BLD AUTO: 0.04 THOUSANDS/ΜL (ref 0–0.1)
BASOPHILS NFR BLD AUTO: 1 % (ref 0–1)
BILIRUB SERPL-MCNC: 0.45 MG/DL (ref 0.2–1)
BUN SERPL-MCNC: 18 MG/DL (ref 5–25)
CALCIUM ALBUM COR SERPL-MCNC: 9.8 MG/DL (ref 8.3–10.1)
CALCIUM SERPL-MCNC: 9.2 MG/DL (ref 8.4–10.2)
CHLORIDE SERPL-SCNC: 103 MMOL/L (ref 96–108)
CHOLEST SERPL-MCNC: 180 MG/DL (ref ?–200)
CO2 SERPL-SCNC: 28 MMOL/L (ref 21–32)
CREAT SERPL-MCNC: 0.96 MG/DL (ref 0.6–1.3)
EOSINOPHIL # BLD AUTO: 0.1 THOUSAND/ΜL (ref 0–0.61)
EOSINOPHIL NFR BLD AUTO: 2 % (ref 0–6)
ERYTHROCYTE [DISTWIDTH] IN BLOOD BY AUTOMATED COUNT: 12.9 % (ref 11.6–15.1)
GFR SERPL CREATININE-BSD FRML MDRD: 61 ML/MIN/1.73SQ M
GLUCOSE P FAST SERPL-MCNC: 89 MG/DL (ref 65–99)
HCT VFR BLD AUTO: 40.7 % (ref 34.8–46.1)
HDLC SERPL-MCNC: 54 MG/DL
HGB BLD-MCNC: 12.4 G/DL (ref 11.5–15.4)
IMM GRANULOCYTES # BLD AUTO: 0.01 THOUSAND/UL (ref 0–0.2)
IMM GRANULOCYTES NFR BLD AUTO: 0 % (ref 0–2)
LDLC SERPL CALC-MCNC: 99 MG/DL (ref 0–100)
LYMPHOCYTES # BLD AUTO: 2.11 THOUSANDS/ΜL (ref 0.6–4.47)
LYMPHOCYTES NFR BLD AUTO: 37 % (ref 14–44)
MCH RBC QN AUTO: 29 PG (ref 26.8–34.3)
MCHC RBC AUTO-ENTMCNC: 30.5 G/DL (ref 31.4–37.4)
MCV RBC AUTO: 95 FL (ref 82–98)
MONOCYTES # BLD AUTO: 0.39 THOUSAND/ΜL (ref 0.17–1.22)
MONOCYTES NFR BLD AUTO: 7 % (ref 4–12)
NEUTROPHILS # BLD AUTO: 3.1 THOUSANDS/ΜL (ref 1.85–7.62)
NEUTS SEG NFR BLD AUTO: 53 % (ref 43–75)
NRBC BLD AUTO-RTO: 0 /100 WBCS
PLATELET # BLD AUTO: 308 THOUSANDS/UL (ref 149–390)
PMV BLD AUTO: 9.5 FL (ref 8.9–12.7)
POTASSIUM SERPL-SCNC: 4.2 MMOL/L (ref 3.5–5.3)
PROT SERPL-MCNC: 7.2 G/DL (ref 6.4–8.4)
RBC # BLD AUTO: 4.28 MILLION/UL (ref 3.81–5.12)
SODIUM SERPL-SCNC: 138 MMOL/L (ref 135–147)
TRIGL SERPL-MCNC: 135 MG/DL (ref ?–150)
TSH SERPL DL<=0.05 MIU/L-ACNC: 3.31 UIU/ML (ref 0.45–4.5)
WBC # BLD AUTO: 5.75 THOUSAND/UL (ref 4.31–10.16)

## 2025-01-21 PROCEDURE — 36415 COLL VENOUS BLD VENIPUNCTURE: CPT | Performed by: PHYSICIAN ASSISTANT

## 2025-01-21 PROCEDURE — 80061 LIPID PANEL: CPT | Performed by: PHYSICIAN ASSISTANT

## 2025-01-21 PROCEDURE — 80053 COMPREHEN METABOLIC PANEL: CPT | Performed by: PHYSICIAN ASSISTANT

## 2025-01-21 PROCEDURE — 84443 ASSAY THYROID STIM HORMONE: CPT | Performed by: PHYSICIAN ASSISTANT

## 2025-01-21 PROCEDURE — 82306 VITAMIN D 25 HYDROXY: CPT | Performed by: PHYSICIAN ASSISTANT

## 2025-01-21 PROCEDURE — 85025 COMPLETE CBC W/AUTO DIFF WBC: CPT | Performed by: PHYSICIAN ASSISTANT

## 2025-01-24 ENCOUNTER — OFFICE VISIT (OUTPATIENT)
Dept: FAMILY MEDICINE CLINIC | Facility: CLINIC | Age: 70
End: 2025-01-24
Payer: COMMERCIAL

## 2025-01-24 VITALS
OXYGEN SATURATION: 96 % | HEIGHT: 63 IN | BODY MASS INDEX: 28.53 KG/M2 | DIASTOLIC BLOOD PRESSURE: 78 MMHG | WEIGHT: 161 LBS | HEART RATE: 102 BPM | SYSTOLIC BLOOD PRESSURE: 118 MMHG

## 2025-01-24 DIAGNOSIS — K63.5 POLYP OF COLON, UNSPECIFIED PART OF COLON, UNSPECIFIED TYPE: ICD-10-CM

## 2025-01-24 DIAGNOSIS — M19.90 ARTHRITIS: ICD-10-CM

## 2025-01-24 DIAGNOSIS — I10 PRIMARY HYPERTENSION: Primary | ICD-10-CM

## 2025-01-24 DIAGNOSIS — K21.9 GASTROESOPHAGEAL REFLUX DISEASE, UNSPECIFIED WHETHER ESOPHAGITIS PRESENT: ICD-10-CM

## 2025-01-24 DIAGNOSIS — N18.31 STAGE 3A CHRONIC KIDNEY DISEASE (HCC): ICD-10-CM

## 2025-01-24 DIAGNOSIS — M81.0 AGE-RELATED OSTEOPOROSIS WITHOUT CURRENT PATHOLOGICAL FRACTURE: ICD-10-CM

## 2025-01-24 DIAGNOSIS — Z00.00 MEDICARE ANNUAL WELLNESS VISIT, SUBSEQUENT: ICD-10-CM

## 2025-01-24 DIAGNOSIS — E55.9 VITAMIN D DEFICIENCY: ICD-10-CM

## 2025-01-24 DIAGNOSIS — Z13.220 LIPID SCREENING: ICD-10-CM

## 2025-01-24 PROBLEM — M25.531 CHRONIC PAIN OF RIGHT WRIST: Status: RESOLVED | Noted: 2019-03-28 | Resolved: 2025-01-24

## 2025-01-24 PROBLEM — G89.29 CHRONIC PAIN OF RIGHT WRIST: Status: RESOLVED | Noted: 2019-03-28 | Resolved: 2025-01-24

## 2025-01-24 PROBLEM — Z86.16 HISTORY OF COVID-19: Status: RESOLVED | Noted: 2022-06-14 | Resolved: 2025-01-24

## 2025-01-24 PROBLEM — N32.81 OAB (OVERACTIVE BLADDER): Status: ACTIVE | Noted: 2025-01-24

## 2025-01-24 PROBLEM — Z12.31 VISIT FOR SCREENING MAMMOGRAM: Status: RESOLVED | Noted: 2024-01-26 | Resolved: 2025-01-24

## 2025-01-24 PROBLEM — E58 DIETARY CALCIUM DEFICIENCY: Status: RESOLVED | Noted: 2017-05-01 | Resolved: 2025-01-24

## 2025-01-24 PROCEDURE — G2211 COMPLEX E/M VISIT ADD ON: HCPCS | Performed by: FAMILY MEDICINE

## 2025-01-24 PROCEDURE — G0439 PPPS, SUBSEQ VISIT: HCPCS | Performed by: FAMILY MEDICINE

## 2025-01-24 PROCEDURE — 99214 OFFICE O/P EST MOD 30 MIN: CPT | Performed by: FAMILY MEDICINE

## 2025-01-24 RX ORDER — METHYLDOPA/HYDROCHLOROTHIAZIDE 250MG-15MG
TABLET ORAL
COMMUNITY

## 2025-01-24 RX ORDER — MIRABEGRON 50 MG/1
TABLET, FILM COATED, EXTENDED RELEASE ORAL EVERY 24 HOURS
COMMUNITY
Start: 2024-11-21

## 2025-01-24 NOTE — ASSESSMENT & PLAN NOTE
Vitamin D is excellent at 71 continue daily supplementation and check in 1 year.  Orders:  •  Vitamin D 25 hydroxy; Future

## 2025-01-24 NOTE — ASSESSMENT & PLAN NOTE
Patient is due to repeat DEXA.  At the last scan her lowest T-score was -2.8 and patient declined treatment so we opted to make sure patient was weightbearing avoidance of excessive alcohol or caffeine, and trying to get in 1200 mg of calcium daily with vitamin D.  New order please call with results.  Orders:  •  DXA bone density spine hip and pelvis; Future

## 2025-01-24 NOTE — ASSESSMENT & PLAN NOTE
Lab Results   Component Value Date    EGFR 61 01/21/2025    EGFR 72 01/18/2024    EGFR 56 10/12/2022    CREATININE 0.96 01/21/2025    CREATININE 0.84 01/18/2024    CREATININE 1.04 10/12/2022   GFR's have fluctuated greatly in the past few years but currently is sitting at 61.  Inform patient to make sure she is keeping well-hydrated 60 glasses of water a day and avoidance of daily or regular anti-inflammatories.  Orders:  •  CBC and differential; Future  •  Comprehensive metabolic panel; Future

## 2025-01-24 NOTE — PROGRESS NOTES
Name: Yudi Barnhart      : 1955      MRN: [id number]  Encounter Provider: Nelly Newby PA-C  Encounter Date: 2025   Encounter department: [facility]    Assessment & Plan  Stage 3a chronic kidney disease (HCC)  Lab Results   Component Value Date    EGFR 61 2025    EGFR 72 2024    EGFR 56 10/12/2022    CREATININE 0.96 2025    CREATININE 0.84 2024    CREATININE 1.04 10/12/2022   GFR's have fluctuated greatly in the past few years but currently is sitting at 61.  Inform patient to make sure she is keeping well-hydrated 60 glasses of water a day and avoidance of daily or regular anti-inflammatories.  Orders:  •  CBC and differential; Future  •  Comprehensive metabolic panel; Future    Primary hypertension  Blood pressure in excellent control on Zestril 10 mg       Age-related osteoporosis without current pathological fracture  Patient is due to repeat DEXA.  At the last scan her lowest T-score was -2.8 and patient declined treatment so we opted to make sure patient was weightbearing avoidance of excessive alcohol or caffeine, and trying to get in 1200 mg of calcium daily with vitamin D.  New order please call with results.  Orders:  •  DXA bone density spine hip and pelvis; Future    Gastroesophageal reflux disease, unspecified whether esophagitis present  At does take PPI daily. Tried to decrease how often she takes this and can not go w/o.        Vitamin D deficiency  Vitamin D is excellent at 71 continue daily supplementation and check in 1 year.  Orders:  •  Vitamin D 25 hydroxy; Future    Polyp of colon, unspecified part of colon, unspecified type  Again patient had colonoscopy in  but Dr. Hermelinda Barron and was told repeat in 1 year was recommended. Is willing to see a different gastro as last prep did not clean her out in order to see colon well. [facility] ordered.   Orders:  •  Ambulatory Referral to Gastroenterology; Future    Medicare annual  wellness visit, subsequent  Patient is due for DEXA.  Given living will today.  Also due for colonoscopy.  Recommend shingles vaccination and RSV at the pharmacy also recommend flu.       Arthritis  Takes tylenol nightly.        Lipid screening    Orders:  •  Lipid panel; Future       Preventive health issues were discussed with patient, and age appropriate screening tests were ordered as noted in patient's After Visit Summary. Personalized health advice and appropriate referrals for health education or preventive services given if needed, as noted in patient's After Visit Summary.    History of Present Illness     Patient presents with:  Medicare Wellness Visit: Pt present today for her medicare wellness visit.  Follow-up: Pt due for routine follow up.      Yudi Barnhart is here for chronic conditions f/u. Pt. had labs done prior to today's visit which included Recent Results (from the past 4 weeks)  -CBC and differential:   Collection Time: 01/21/25  9:15 AM       Result                      Value             Ref Range           WBC                         5.75              4.31 - 10.16*       RBC                         4.28              3.81 - 5.12 *       Hemoglobin                  12.4              11.5 - 15.4 *       Hematocrit                  40.7              34.8 - 46.1 %       MCV                         95                82 - 98 fL          MCH                         29.0              26.8 - 34.3 *       MCHC                        30.5 (L)          31.4 - 37.4 *       RDW                         12.9              11.6 - 15.1 %       MPV                         9.5               8.9 - 12.7 fL       Platelets                   308               149 - 390 Th*       nRBC                        0                 /100 WBCs           Segmented %                 53                43 - 75 %           Immature Grans %            0                 0 - 2 %             Lymphocytes %               37                 14 - 44 %           Monocytes %                 7                 4 - 12 %            Eosinophils Relative        2                 0 - 6 %             Basophils Relative          1                 0 - 1 %             Absolute Neutrophils        3.10              1.85 - 7.62 *       Absolute Immature Grans     0.01              0.00 - 0.20 *       Absolute Lymphocytes        2.11              0.60 - 4.47 *       Absolute Monocytes          0.39              0.17 - 1.22 *       Eosinophils Absolute        0.10              0.00 - 0.61 *       Basophils Absolute          0.04              0.00 - 0.10 *  -Comprehensive metabolic panel:   Collection Time: 01/21/25  9:15 AM       Result                      Value             Ref Range           Sodium                      138               135 - 147 mm*       Potassium                   4.2               3.5 - 5.3 mm*       Chloride                    103               96 - 108 mmo*       CO2                         28                21 - 32 mmol*       ANION GAP                   7                 4 - 13 mmol/L       BUN                         18                5 - 25 mg/dL        Creatinine                  0.96              0.60 - 1.30 *       Glucose, Fasting            89                65 - 99 mg/dL       Calcium                     9.2               8.4 - 10.2 m*       Corrected Calcium           9.8               8.3 - 10.1 m*       AST                         9 (L)             13 - 39 U/L         ALT                         12                7 - 52 U/L          Alkaline Phosphatase        85                34 - 104 U/L        Total Protein               7.2               6.4 - 8.4 g/*       Albumin                     3.2 (L)           3.5 - 5.0 g/*       Total Bilirubin             0.45              0.20 - 1.00 *       eGFR                        61                ml/min/1.73s*  -Lipid Panel with Direct LDL reflex:   Collection Time: 01/21/25  9:15 AM        Result                      Value             Ref Range           Cholesterol                 180               See Comment *       Triglycerides               135               See Comment *       HDL, Direct                 54                >=50 mg/dL          LDL Calculated              99                0 - 100 mg/dL  -Vitamin D 25 hydroxy:   Collection Time: 01/21/25  9:15 AM       Result                      Value             Ref Range           Vit D, 25-Hydroxy           71.8              30.0 - 100.0*  -TSH, 3rd generation with Free T4 reflex:   Collection Time: 01/21/25  9:15 AM       Result                      Value             Ref Range           TSH 3RD GENERATON           3.307             0.450 - 4.50*         Patient Care Team:  Nelly Newby PA-C as PCP - General (Family Medicine)  MD Kadeem Herrera MD    Review of Systems   Constitutional: Negative.    HENT: Negative.     Eyes: Negative.    Respiratory: Negative.     Cardiovascular: Negative.    Gastrointestinal: Negative.    Endocrine: Negative.    Genitourinary: Negative.    Musculoskeletal: Negative.    Skin: Negative.    Allergic/Immunologic: Negative.    Neurological: Negative.    Hematological: Negative.    Psychiatric/Behavioral: Negative.       Medical History Reviewed by provider this encounter:       Annual Wellness Visit Questionnaire   Yudi is here for her Subsequent Wellness visit.     Health Risk Assessment:   Patient rates overall health as very good. Patient feels that their physical health rating is slightly better. Patient is very satisfied with their life. Eyesight was rated as slightly worse. Hearing was rated as same. Patient feels that their emotional and mental health rating is same. Patients states they are never, rarely angry. Patient states they are sometimes unusually tired/fatigued. Pain experienced in the last 7 days has been none. Patient states that she has experienced no weight  loss or gain in last 6 months.     Depression Screening:   PHQ-2 Score: 0      Fall Risk Screening:   In the past year, patient has experienced: history of falling in past year    Number of falls: 1  Injured during fall?: Yes    Feels unsteady when standing or walking?: No    Worried about falling?: Yes      Urinary Incontinence Screening:   Patient has leaked urine accidently in the last six months.     Home Safety:  Patient does not have trouble with stairs inside or outside of their home. Patient has working smoke alarms and has working carbon monoxide detector. Home safety hazards include: loose rugs on the floor.     Nutrition:   Current diet is Regular.     Medications:   Patient is currently taking over-the-counter supplements. OTC medications include: see medication list. Patient is able to manage medications.     Activities of Daily Living (ADLs)/Instrumental Activities of Daily Living (IADLs):   Walk and transfer into and out of bed and chair?: Yes  Dress and groom yourself?: Yes    Bathe or shower yourself?: Yes    Feed yourself? Yes  Do your laundry/housekeeping?: Yes  Manage your money, pay your bills and track your expenses?: Yes  Make your own meals?: Yes    Do your own shopping?: Yes    Previous Hospitalizations:   Any hospitalizations or ED visits within the last 12 months?: No      Advance Care Planning:   Living will: No    Durable POA for healthcare: No    Advanced directive counseling given: Yes    ACP document given: Yes      Cognitive Screening:   Provider or family/friend/caregiver concerned regarding cognition?: No    PREVENTIVE SCREENINGS      Cardiovascular Screening:    General: Screening Not Indicated, History Lipid Disorder and Screening Current      Diabetes Screening:     General: Screening Current      Colorectal Cancer Screening:     General: Screening Current      Breast Cancer Screening:     General: Screening Current      Cervical Cancer Screening:    General: Screening Not  "Indicated      Osteoporosis Screening:    General: Screening Not Indicated and History Osteoporosis      Abdominal Aortic Aneurysm (AAA) Screening:        General: Screening Not Indicated      Lung Cancer Screening:     General: Screening Not Indicated      Hepatitis C Screening:    General: Screening Current    Screening, Brief Intervention, and Referral to Treatment (SBIRT)    Screening    Typical number of drinks in a week: 0    Single Item Drug Screening:  How often have you used an illegal drug (including marijuana) or a prescription medication for non-medical reasons in the past year? never    Single Item Drug Screen Score: 0  Interpretation: Negative screen for possible drug use disorder    Social Drivers of Health     Financial Resource Strain: Low Risk  (1/23/2024)    Overall Financial Resource Strain (CARDIA)    • Difficulty of Paying Living Expenses: Not very hard   Food Insecurity: No Food Insecurity (1/24/2025)    Hunger Vital Sign    • Worried About Running Out of Food in the Last Year: Never true    • Ran Out of Food in the Last Year: Never true   Transportation Needs: No Transportation Needs (1/24/2025)    PRAPARE - Transportation    • Lack of Transportation (Medical): No    • Lack of Transportation (Non-Medical): No   Housing Stability: Low Risk  (1/24/2025)    Housing Stability Vital Sign    • Unable to Pay for Housing in the Last Year: No    • Number of Times Moved in the Last Year: 0    • Homeless in the Last Year: No   Utilities: Not At Risk (1/24/2025)    [facility] Utilities    • Threatened with loss of utilities: No     No results found.    Objective   /78 (BP Location: Right arm, Patient Position: Sitting, Cuff Size: Large)   Pulse 102   Ht 5' 3\" (1.6 m)   Wt 73 kg (161 lb)   SpO2 96%   BMI 28.52 kg/m²     Physical Exam  Vitals and nursing note reviewed.   Constitutional:       Appearance: Normal appearance. She is well-developed.   HENT:      Head: Normocephalic and atraumatic.   Eyes: "      General: Lids are normal.      Conjunctiva/sclera: Conjunctivae normal.      Pupils: Pupils are equal, round, and reactive to light.   Cardiovascular:      Rate and Rhythm: Normal rate and regular rhythm.      Heart sounds: No murmur heard.  Pulmonary:      Effort: Pulmonary effort is normal.      Breath sounds: Normal breath sounds.   Skin:     General: Skin is warm and dry.   Neurological:      General: No focal deficit present.      Mental Status: She is alert.      Coordination: Coordination is intact.   Psychiatric:         Mood and Affect: Mood normal.         Behavior: Behavior normal. Behavior is cooperative.         Thought Content: Thought content normal.         Judgment: Judgment normal.

## 2025-01-24 NOTE — ASSESSMENT & PLAN NOTE
Again patient had colonoscopy in 2022 but Dr. Hermelinda Barron and was told repeat in 1 year was recommended. Is willing to see a different gastro as last prep did not clean her out in order to see colon well. [facility] ordered.   Orders:  •  Ambulatory Referral to Gastroenterology; Future

## 2025-01-24 NOTE — PATIENT INSTRUCTIONS
1. Primary hypertension  Assessment & Plan:  Blood pressure in excellent control on Zestril 10 mg       2. Stage 3a chronic kidney disease (HCC)  Assessment & Plan:  Lab Results   Component Value Date    EGFR 61 01/21/2025    EGFR 72 01/18/2024    EGFR 56 10/12/2022    CREATININE 0.96 01/21/2025    CREATININE 0.84 01/18/2024    CREATININE 1.04 10/12/2022   GFR's have fluctuated greatly in the past few years but currently is sitting at 61.  Inform patient to make sure she is keeping well-hydrated 60 glasses of water a day and avoidance of daily or regular anti-inflammatories.       Orders:  -     CBC and differential; Future; Expected date: 01/24/2026  -     Comprehensive metabolic panel; Future; Expected date: 01/24/2026  3. Age-related osteoporosis without current pathological fracture  Assessment & Plan:  Patient is due to repeat DEXA.  At the last scan her lowest T-score was -2.8 and patient declined treatment so we opted to make sure patient was weightbearing avoidance of excessive alcohol or caffeine, and trying to get in 1200 mg of calcium daily with vitamin D.  New order please call with results.  Orders:    DXA bone density spine hip and pelvis; Future    Orders:  -     DXA bone density spine hip and pelvis; Future; Expected date: 01/24/2025  4. Gastroesophageal reflux disease, unspecified whether esophagitis present  Assessment & Plan:  At does take PPI daily.       5. Vitamin D deficiency  Assessment & Plan:  Vitamin D is excellent at 71 continue daily supplementation and check in 1 year.       Orders:  -     Vitamin D 25 hydroxy; Future; Expected date: 01/24/2026  6. Polyp of colon, unspecified part of colon, unspecified type  Assessment & Plan:  Again patient had colonoscopy in 2022 but Dr. Hermelinda Barron and was told repeat in 1 year was recommended.       Orders:  -     Ambulatory Referral to Gastroenterology; Future  7. Medicare annual wellness visit, subsequent  8. Arthritis  Assessment & Plan:  Takes  tylenol nightly.   9. Lipid screening  -     Lipid panel; Future; Expected date: 01/24/2026

## 2025-04-04 DIAGNOSIS — K21.9 GASTROESOPHAGEAL REFLUX DISEASE, UNSPECIFIED WHETHER ESOPHAGITIS PRESENT: ICD-10-CM

## 2025-04-04 RX ORDER — OMEPRAZOLE 20 MG/1
20 CAPSULE, DELAYED RELEASE ORAL
Qty: 90 CAPSULE | Refills: 1 | Status: SHIPPED | OUTPATIENT
Start: 2025-04-04

## 2025-07-03 DIAGNOSIS — I10 ESSENTIAL HYPERTENSION: ICD-10-CM

## 2025-07-03 RX ORDER — LISINOPRIL 10 MG/1
10 TABLET ORAL DAILY
Qty: 90 TABLET | Refills: 1 | Status: SHIPPED | OUTPATIENT
Start: 2025-07-03